# Patient Record
Sex: MALE | Race: WHITE | NOT HISPANIC OR LATINO | Employment: FULL TIME | URBAN - METROPOLITAN AREA
[De-identification: names, ages, dates, MRNs, and addresses within clinical notes are randomized per-mention and may not be internally consistent; named-entity substitution may affect disease eponyms.]

---

## 2017-01-03 ENCOUNTER — COMMUNICATION - HEALTHEAST (OUTPATIENT)
Dept: INTERNAL MEDICINE | Facility: CLINIC | Age: 57
End: 2017-01-03

## 2017-01-17 ENCOUNTER — COMMUNICATION - HEALTHEAST (OUTPATIENT)
Dept: INTERNAL MEDICINE | Facility: CLINIC | Age: 57
End: 2017-01-17

## 2017-01-17 ENCOUNTER — COMMUNICATION - HEALTHEAST (OUTPATIENT)
Dept: TELEHEALTH | Facility: CLINIC | Age: 57
End: 2017-01-17

## 2017-01-17 ENCOUNTER — OFFICE VISIT - HEALTHEAST (OUTPATIENT)
Dept: INTERNAL MEDICINE | Facility: CLINIC | Age: 57
End: 2017-01-17

## 2017-01-17 DIAGNOSIS — I10 ESSENTIAL HYPERTENSION: ICD-10-CM

## 2017-01-17 DIAGNOSIS — Z87.39 HISTORY OF GOUT: ICD-10-CM

## 2017-01-17 DIAGNOSIS — Z13.9 SCREENING: ICD-10-CM

## 2017-01-17 DIAGNOSIS — D17.1 LIPOMA OF TORSO: ICD-10-CM

## 2017-01-17 DIAGNOSIS — Z72.0 TOBACCO ABUSE: ICD-10-CM

## 2017-01-17 DIAGNOSIS — E78.5 HYPERLIPIDEMIA: ICD-10-CM

## 2017-01-17 DIAGNOSIS — E11.9 TYPE 2 DIABETES MELLITUS WITHOUT COMPLICATION (H): ICD-10-CM

## 2017-01-17 DIAGNOSIS — L71.9 ROSACEA: ICD-10-CM

## 2017-01-17 LAB
CHOLEST SERPL-MCNC: 267 MG/DL
FASTING STATUS PATIENT QL REPORTED: YES
HBA1C MFR BLD: 6.7 % (ref 3.5–6)
HDLC SERPL-MCNC: 43 MG/DL
LDLC SERPL CALC-MCNC: 128 MG/DL
LDLC SERPL CALC-MCNC: ABNORMAL MG/DL
PSA SERPL-MCNC: 0.6 NG/ML (ref 0–3.5)
TRIGL SERPL-MCNC: 416 MG/DL

## 2017-01-23 ENCOUNTER — COMMUNICATION - HEALTHEAST (OUTPATIENT)
Dept: INTERNAL MEDICINE | Facility: CLINIC | Age: 57
End: 2017-01-23

## 2017-01-26 ENCOUNTER — RECORDS - HEALTHEAST (OUTPATIENT)
Dept: ADMINISTRATIVE | Facility: OTHER | Age: 57
End: 2017-01-26

## 2017-07-06 ENCOUNTER — COMMUNICATION - HEALTHEAST (OUTPATIENT)
Dept: INTERNAL MEDICINE | Facility: CLINIC | Age: 57
End: 2017-07-06

## 2017-08-08 ENCOUNTER — COMMUNICATION - HEALTHEAST (OUTPATIENT)
Dept: INTERNAL MEDICINE | Facility: CLINIC | Age: 57
End: 2017-08-08

## 2018-01-24 ENCOUNTER — COMMUNICATION - HEALTHEAST (OUTPATIENT)
Dept: INTERNAL MEDICINE | Facility: CLINIC | Age: 58
End: 2018-01-24

## 2018-02-28 ENCOUNTER — COMMUNICATION - HEALTHEAST (OUTPATIENT)
Dept: INTERNAL MEDICINE | Facility: CLINIC | Age: 58
End: 2018-02-28

## 2018-03-01 ENCOUNTER — OFFICE VISIT - HEALTHEAST (OUTPATIENT)
Dept: INTERNAL MEDICINE | Facility: CLINIC | Age: 58
End: 2018-03-01

## 2018-03-01 ENCOUNTER — AMBULATORY - HEALTHEAST (OUTPATIENT)
Dept: INTERNAL MEDICINE | Facility: CLINIC | Age: 58
End: 2018-03-01

## 2018-03-01 ENCOUNTER — RECORDS - HEALTHEAST (OUTPATIENT)
Dept: ADMINISTRATIVE | Facility: OTHER | Age: 58
End: 2018-03-01

## 2018-03-01 DIAGNOSIS — L71.9 ROSACEA: ICD-10-CM

## 2018-03-01 DIAGNOSIS — Z00.00 ROUTINE GENERAL MEDICAL EXAMINATION AT A HEALTH CARE FACILITY: ICD-10-CM

## 2018-03-01 DIAGNOSIS — Z11.59 ENCOUNTER FOR HEPATITIS C SCREENING TEST FOR LOW RISK PATIENT: ICD-10-CM

## 2018-03-01 DIAGNOSIS — Z72.0 TOBACCO ABUSE: ICD-10-CM

## 2018-03-01 DIAGNOSIS — E11.9 TYPE 2 DIABETES MELLITUS WITHOUT COMPLICATION, WITHOUT LONG-TERM CURRENT USE OF INSULIN (H): ICD-10-CM

## 2018-03-01 DIAGNOSIS — E78.5 HYPERLIPIDEMIA: ICD-10-CM

## 2018-03-01 DIAGNOSIS — D17.1 LIPOMA OF TORSO: ICD-10-CM

## 2018-03-01 DIAGNOSIS — I10 ESSENTIAL HYPERTENSION: ICD-10-CM

## 2018-03-01 LAB
ALBUMIN SERPL-MCNC: 4.1 G/DL (ref 3.5–5)
ALP SERPL-CCNC: 88 U/L (ref 45–120)
ALT SERPL W P-5'-P-CCNC: 33 U/L (ref 0–45)
ANION GAP SERPL CALCULATED.3IONS-SCNC: 13 MMOL/L (ref 5–18)
AST SERPL W P-5'-P-CCNC: 29 U/L (ref 0–40)
BILIRUB DIRECT SERPL-MCNC: 0.2 MG/DL
BILIRUB SERPL-MCNC: 0.8 MG/DL (ref 0–1)
BUN SERPL-MCNC: 11 MG/DL (ref 8–22)
CALCIUM SERPL-MCNC: 10.1 MG/DL (ref 8.5–10.5)
CHLORIDE BLD-SCNC: 98 MMOL/L (ref 98–107)
CHOLEST SERPL-MCNC: 225 MG/DL
CO2 SERPL-SCNC: 25 MMOL/L (ref 22–31)
CREAT SERPL-MCNC: 0.81 MG/DL (ref 0.7–1.3)
CREAT UR-MCNC: 24 MG/DL
FASTING STATUS PATIENT QL REPORTED: YES
GFR SERPL CREATININE-BSD FRML MDRD: >60 ML/MIN/1.73M2
GLUCOSE BLD-MCNC: 135 MG/DL (ref 70–125)
HBA1C MFR BLD: 7.4 % (ref 3.5–6)
HDLC SERPL-MCNC: 40 MG/DL
HGB BLD-MCNC: 14.4 G/DL (ref 14–18)
LDLC SERPL CALC-MCNC: 112 MG/DL
MICROALBUMIN UR-MCNC: 1.84 MG/DL (ref 0–1.99)
MICROALBUMIN/CREAT UR: 76.7 MG/G
POTASSIUM BLD-SCNC: 3.9 MMOL/L (ref 3.5–5)
PROT SERPL-MCNC: 7.7 G/DL (ref 6–8)
PSA SERPL-MCNC: 0.7 NG/ML (ref 0–3.5)
SODIUM SERPL-SCNC: 136 MMOL/L (ref 136–145)
TRIGL SERPL-MCNC: 367 MG/DL

## 2018-03-01 RX ORDER — ASCORBIC ACID 500 MG
500 TABLET ORAL DAILY
Status: SHIPPED | COMMUNITY
Start: 2018-03-01 | End: 2023-12-04

## 2018-03-01 ASSESSMENT — MIFFLIN-ST. JEOR
SCORE: 1720.14
SCORE: 1720.14

## 2018-03-02 LAB — HCV AB SERPL QL IA: NEGATIVE

## 2018-03-05 ENCOUNTER — COMMUNICATION - HEALTHEAST (OUTPATIENT)
Dept: INTERNAL MEDICINE | Facility: CLINIC | Age: 58
End: 2018-03-05

## 2018-03-05 DIAGNOSIS — E78.5 HYPERLIPIDEMIA: ICD-10-CM

## 2018-03-20 ENCOUNTER — OFFICE VISIT - HEALTHEAST (OUTPATIENT)
Dept: SURGERY | Facility: CLINIC | Age: 58
End: 2018-03-20

## 2018-03-20 DIAGNOSIS — D17.1 LIPOMA OF TORSO: ICD-10-CM

## 2018-03-20 ASSESSMENT — MIFFLIN-ST. JEOR: SCORE: 1718.67

## 2018-04-03 ENCOUNTER — AMBULATORY - HEALTHEAST (OUTPATIENT)
Dept: SURGERY | Facility: CLINIC | Age: 58
End: 2018-04-03

## 2018-04-03 DIAGNOSIS — D17.1 LIPOMA OF CHEST WALL: ICD-10-CM

## 2018-04-05 LAB
LAB AP CHARGES (HE HISTORICAL CONVERSION): NORMAL
PATH REPORT.COMMENTS IMP SPEC: NORMAL
PATH REPORT.FINAL DX SPEC: NORMAL
PATH REPORT.GROSS SPEC: NORMAL
PATH REPORT.MICROSCOPIC SPEC OTHER STN: NORMAL
PATH REPORT.RELEVANT HX SPEC: NORMAL
RESULT FLAG (HE HISTORICAL CONVERSION): NORMAL

## 2018-06-05 ENCOUNTER — COMMUNICATION - HEALTHEAST (OUTPATIENT)
Dept: INTERNAL MEDICINE | Facility: CLINIC | Age: 58
End: 2018-06-05

## 2018-06-05 DIAGNOSIS — I10 ESSENTIAL HYPERTENSION: ICD-10-CM

## 2018-12-16 ENCOUNTER — AMBULATORY - HEALTHEAST (OUTPATIENT)
Dept: INTENSIVE CARE | Facility: CLINIC | Age: 58
End: 2018-12-16

## 2018-12-16 DIAGNOSIS — J18.9 PARAPNEUMONIC EFFUSION: ICD-10-CM

## 2018-12-16 DIAGNOSIS — J91.8 PARAPNEUMONIC EFFUSION: ICD-10-CM

## 2018-12-19 ENCOUNTER — COMMUNICATION - HEALTHEAST (OUTPATIENT)
Dept: INTERNAL MEDICINE | Facility: CLINIC | Age: 58
End: 2018-12-19

## 2018-12-20 ENCOUNTER — COMMUNICATION - HEALTHEAST (OUTPATIENT)
Dept: CARE COORDINATION | Facility: CLINIC | Age: 58
End: 2018-12-20

## 2019-01-03 ENCOUNTER — OFFICE VISIT - HEALTHEAST (OUTPATIENT)
Dept: INTERNAL MEDICINE | Facility: CLINIC | Age: 59
End: 2019-01-03

## 2019-01-03 DIAGNOSIS — E78.2 MIXED HYPERLIPIDEMIA: ICD-10-CM

## 2019-01-03 DIAGNOSIS — I10 ESSENTIAL HYPERTENSION: ICD-10-CM

## 2019-01-03 DIAGNOSIS — E11.9 TYPE 2 DIABETES MELLITUS WITHOUT COMPLICATION, WITHOUT LONG-TERM CURRENT USE OF INSULIN (H): ICD-10-CM

## 2019-01-03 DIAGNOSIS — J91.8 PARAPNEUMONIC EFFUSION: ICD-10-CM

## 2019-01-03 DIAGNOSIS — Z72.0 TOBACCO ABUSE: ICD-10-CM

## 2019-01-03 DIAGNOSIS — J15.9 COMMUNITY ACQUIRED BACTERIAL PNEUMONIA: ICD-10-CM

## 2019-01-03 DIAGNOSIS — J18.9 PARAPNEUMONIC EFFUSION: ICD-10-CM

## 2019-01-03 LAB
CHOLEST SERPL-MCNC: 166 MG/DL
ERYTHROCYTE [DISTWIDTH] IN BLOOD BY AUTOMATED COUNT: 11.6 % (ref 11–14.5)
FASTING STATUS PATIENT QL REPORTED: YES
HCT VFR BLD AUTO: 33.9 % (ref 40–54)
HDLC SERPL-MCNC: 35 MG/DL
HGB BLD-MCNC: 11.5 G/DL (ref 14–18)
LDLC SERPL CALC-MCNC: 92 MG/DL
MCH RBC QN AUTO: 28.8 PG (ref 27–34)
MCHC RBC AUTO-ENTMCNC: 33.8 G/DL (ref 32–36)
MCV RBC AUTO: 85 FL (ref 80–100)
PLATELET # BLD AUTO: 352 THOU/UL (ref 140–440)
PMV BLD AUTO: 7.4 FL (ref 7–10)
RBC # BLD AUTO: 3.98 MILL/UL (ref 4.4–6.2)
TRIGL SERPL-MCNC: 194 MG/DL
WBC: 9.1 THOU/UL (ref 4–11)

## 2019-01-03 ASSESSMENT — MIFFLIN-ST. JEOR: SCORE: 1729.79

## 2019-01-04 ENCOUNTER — COMMUNICATION - HEALTHEAST (OUTPATIENT)
Dept: INTERNAL MEDICINE | Facility: CLINIC | Age: 59
End: 2019-01-04

## 2019-01-04 ENCOUNTER — AMBULATORY - HEALTHEAST (OUTPATIENT)
Dept: INTERNAL MEDICINE | Facility: CLINIC | Age: 59
End: 2019-01-04

## 2019-01-04 DIAGNOSIS — J18.9 PLEURAL EFFUSION ASSOCIATED WITH PULMONARY INFECTION: ICD-10-CM

## 2019-01-04 DIAGNOSIS — J91.8 PLEURAL EFFUSION ASSOCIATED WITH PULMONARY INFECTION: ICD-10-CM

## 2019-01-15 ENCOUNTER — HOSPITAL ENCOUNTER (OUTPATIENT)
Dept: CT IMAGING | Facility: CLINIC | Age: 59
Discharge: HOME | End: 2019-01-15
Attending: INTERNAL MEDICINE

## 2019-01-15 ENCOUNTER — COMMUNICATION - HEALTHEAST (OUTPATIENT)
Dept: INTERNAL MEDICINE | Facility: CLINIC | Age: 59
End: 2019-01-15

## 2019-01-15 DIAGNOSIS — J18.9 PLEURAL EFFUSION ASSOCIATED WITH PULMONARY INFECTION: ICD-10-CM

## 2019-01-15 DIAGNOSIS — J91.8 PLEURAL EFFUSION ASSOCIATED WITH PULMONARY INFECTION: ICD-10-CM

## 2019-01-15 LAB
CREAT BLD-MCNC: 0.7 MG/DL
POC GFR AMER AF HE - HISTORICAL: >60 ML/MIN/1.73M2
POC GFR NON AMER AF HE - HISTORICAL: >60 ML/MIN/1.73M2

## 2019-01-21 ENCOUNTER — COMMUNICATION - HEALTHEAST (OUTPATIENT)
Dept: INTERNAL MEDICINE | Facility: CLINIC | Age: 59
End: 2019-01-21

## 2019-01-24 ENCOUNTER — OFFICE VISIT - HEALTHEAST (OUTPATIENT)
Dept: PULMONOLOGY | Facility: OTHER | Age: 59
End: 2019-01-24

## 2019-01-24 ENCOUNTER — RECORDS - HEALTHEAST (OUTPATIENT)
Dept: PULMONOLOGY | Facility: OTHER | Age: 59
End: 2019-01-24

## 2019-01-24 DIAGNOSIS — R06.00 DYSPNEA, UNSPECIFIED TYPE: ICD-10-CM

## 2019-01-24 DIAGNOSIS — J90 RECURRENT RIGHT PLEURAL EFFUSION: ICD-10-CM

## 2019-01-24 DIAGNOSIS — K21.9 GASTROESOPHAGEAL REFLUX DISEASE, ESOPHAGITIS PRESENCE NOT SPECIFIED: ICD-10-CM

## 2019-01-24 ASSESSMENT — MIFFLIN-ST. JEOR: SCORE: 1752.47

## 2019-02-04 ENCOUNTER — HOSPITAL ENCOUNTER (OUTPATIENT)
Dept: CT IMAGING | Facility: HOSPITAL | Age: 59
Discharge: HOME | End: 2019-02-04
Attending: INTERNAL MEDICINE

## 2019-02-04 ENCOUNTER — HOSPITAL ENCOUNTER (OUTPATIENT)
Dept: ULTRASOUND IMAGING | Facility: HOSPITAL | Age: 59
Discharge: HOME | End: 2019-02-04
Attending: INTERNAL MEDICINE | Admitting: RADIOLOGY

## 2019-02-04 ENCOUNTER — AMBULATORY - HEALTHEAST (OUTPATIENT)
Dept: LAB | Facility: HOSPITAL | Age: 59
End: 2019-02-04

## 2019-02-04 ENCOUNTER — COMMUNICATION - HEALTHEAST (OUTPATIENT)
Dept: TELEHEALTH | Facility: CLINIC | Age: 59
End: 2019-02-04

## 2019-02-04 DIAGNOSIS — J90 RECURRENT RIGHT PLEURAL EFFUSION: ICD-10-CM

## 2019-02-04 LAB
INR PPP: 0.95 (ref 0.9–1.1)
LDH FLD L TO P-CCNC: NORMAL U/L
PLATELET # BLD AUTO: 251 THOU/UL (ref 140–440)
PROT FLD-MCNC: 5.9 G/DL

## 2019-02-05 LAB
ACID FAST STN SPEC QL: NORMAL
KOH PREPARATION: NORMAL
ORGANISMS/SLIDE: NORMAL
PATH REPORT.COMMENTS IMP SPEC: NORMAL
PATH REPORT.FINAL DX SPEC: NORMAL
PATH REPORT.MICROSCOPIC SPEC OTHER STN: NORMAL
RESULT FLAG (HE HISTORICAL CONVERSION): NORMAL

## 2019-02-07 LAB
BACTERIA SPEC CULT: NO GROWTH
BACTERIA SPEC CULT: NORMAL
CAP COMMENT: NORMAL
GRAM STAIN RESULT: NORMAL
GRAM STAIN RESULT: NORMAL
LAB AP CHARGES (HE HISTORICAL CONVERSION): NORMAL
LAB MED GENERAL PATH INTERP (HE HISTORICAL CONVERSION): NORMAL
PATH REPORT.COMMENTS IMP SPEC: NORMAL
PATH REPORT.FINAL DX SPEC: NORMAL
PATH REPORT.MICROSCOPIC SPEC OTHER STN: NORMAL
PATH REPORT.RELEVANT HX SPEC: NORMAL
RESULT FLAG (HE HISTORICAL CONVERSION): NORMAL
SPECIMEN DESCRIPTION: NORMAL

## 2019-02-18 ENCOUNTER — HOSPITAL ENCOUNTER (OUTPATIENT)
Dept: RESPIRATORY THERAPY | Facility: HOSPITAL | Age: 59
Discharge: HOME | End: 2019-02-18

## 2019-02-18 DIAGNOSIS — R06.00 DYSPNEA, UNSPECIFIED TYPE: ICD-10-CM

## 2019-02-18 LAB — HGB BLD-MCNC: 14.4 G/DL (ref 14–18)

## 2019-02-19 ENCOUNTER — COMMUNICATION - HEALTHEAST (OUTPATIENT)
Dept: INTERNAL MEDICINE | Facility: CLINIC | Age: 59
End: 2019-02-19

## 2019-02-19 ENCOUNTER — OFFICE VISIT - HEALTHEAST (OUTPATIENT)
Dept: PULMONOLOGY | Facility: OTHER | Age: 59
End: 2019-02-19

## 2019-02-19 DIAGNOSIS — Z87.891 PERSONAL HISTORY OF TOBACCO USE, PRESENTING HAZARDS TO HEALTH: ICD-10-CM

## 2019-02-19 DIAGNOSIS — J90 LOCULATED PLEURAL EFFUSION: ICD-10-CM

## 2019-02-19 DIAGNOSIS — J98.4 RESTRICTIVE LUNG DISEASE: ICD-10-CM

## 2019-02-22 ENCOUNTER — COMMUNICATION - HEALTHEAST (OUTPATIENT)
Dept: INTERNAL MEDICINE | Facility: CLINIC | Age: 59
End: 2019-02-22

## 2019-02-22 DIAGNOSIS — I10 ESSENTIAL HYPERTENSION: ICD-10-CM

## 2019-02-26 LAB — BACTERIA SPEC CULT: NORMAL

## 2019-02-28 ENCOUNTER — OFFICE VISIT - HEALTHEAST (OUTPATIENT)
Dept: PULMONOLOGY | Facility: OTHER | Age: 59
End: 2019-02-28

## 2019-02-28 DIAGNOSIS — J90 PLEURAL EFFUSION: ICD-10-CM

## 2019-02-28 ASSESSMENT — MIFFLIN-ST. JEOR: SCORE: 1757.01

## 2019-03-19 LAB — BACTERIA SPEC CULT: NORMAL

## 2019-04-25 ENCOUNTER — HOSPITAL ENCOUNTER (OUTPATIENT)
Dept: CT IMAGING | Facility: HOSPITAL | Age: 59
Discharge: HOME | End: 2019-04-25
Attending: THORACIC SURGERY (CARDIOTHORACIC VASCULAR SURGERY)

## 2019-04-25 ENCOUNTER — OFFICE VISIT - HEALTHEAST (OUTPATIENT)
Dept: PULMONOLOGY | Facility: OTHER | Age: 59
End: 2019-04-25

## 2019-04-25 DIAGNOSIS — J90 PLEURAL EFFUSION: ICD-10-CM

## 2019-04-25 ASSESSMENT — MIFFLIN-ST. JEOR: SCORE: 1784.22

## 2019-04-27 ENCOUNTER — COMMUNICATION - HEALTHEAST (OUTPATIENT)
Dept: INTERNAL MEDICINE | Facility: CLINIC | Age: 59
End: 2019-04-27

## 2019-05-29 ENCOUNTER — COMMUNICATION - HEALTHEAST (OUTPATIENT)
Dept: INTERNAL MEDICINE | Facility: CLINIC | Age: 59
End: 2019-05-29

## 2019-05-29 DIAGNOSIS — E78.5 HYPERLIPIDEMIA: ICD-10-CM

## 2019-07-18 ENCOUNTER — HOSPITAL ENCOUNTER (OUTPATIENT)
Dept: CT IMAGING | Facility: HOSPITAL | Age: 59
Discharge: HOME | End: 2019-07-18
Attending: THORACIC SURGERY (CARDIOTHORACIC VASCULAR SURGERY)

## 2019-07-18 ENCOUNTER — OFFICE VISIT - HEALTHEAST (OUTPATIENT)
Dept: PULMONOLOGY | Facility: OTHER | Age: 59
End: 2019-07-18

## 2019-07-18 DIAGNOSIS — J90 PLEURAL EFFUSION: ICD-10-CM

## 2019-07-18 ASSESSMENT — MIFFLIN-ST. JEOR: SCORE: 1747.93

## 2019-11-21 ENCOUNTER — COMMUNICATION - HEALTHEAST (OUTPATIENT)
Dept: INTERNAL MEDICINE | Facility: CLINIC | Age: 59
End: 2019-11-21

## 2019-11-21 DIAGNOSIS — I10 ESSENTIAL HYPERTENSION: ICD-10-CM

## 2019-12-05 ENCOUNTER — AMBULATORY - HEALTHEAST (OUTPATIENT)
Dept: INTERNAL MEDICINE | Facility: CLINIC | Age: 59
End: 2019-12-05

## 2019-12-05 DIAGNOSIS — E11.9 TYPE 2 DIABETES MELLITUS WITHOUT COMPLICATION, WITHOUT LONG-TERM CURRENT USE OF INSULIN (H): ICD-10-CM

## 2020-02-25 ENCOUNTER — COMMUNICATION - HEALTHEAST (OUTPATIENT)
Dept: INTERNAL MEDICINE | Facility: CLINIC | Age: 60
End: 2020-02-25

## 2020-02-25 DIAGNOSIS — I10 ESSENTIAL HYPERTENSION: ICD-10-CM

## 2020-05-28 ENCOUNTER — COMMUNICATION - HEALTHEAST (OUTPATIENT)
Dept: INTERNAL MEDICINE | Facility: CLINIC | Age: 60
End: 2020-05-28

## 2020-05-28 DIAGNOSIS — I10 ESSENTIAL HYPERTENSION: ICD-10-CM

## 2020-06-15 ENCOUNTER — COMMUNICATION - HEALTHEAST (OUTPATIENT)
Dept: INTERNAL MEDICINE | Facility: CLINIC | Age: 60
End: 2020-06-15

## 2020-06-15 ENCOUNTER — OFFICE VISIT - HEALTHEAST (OUTPATIENT)
Dept: INTERNAL MEDICINE | Facility: CLINIC | Age: 60
End: 2020-06-15

## 2020-06-15 DIAGNOSIS — I10 ESSENTIAL HYPERTENSION: ICD-10-CM

## 2020-06-15 DIAGNOSIS — K21.9 GASTROESOPHAGEAL REFLUX DISEASE, ESOPHAGITIS PRESENCE NOT SPECIFIED: ICD-10-CM

## 2020-06-15 DIAGNOSIS — F17.201 TOBACCO ABUSE, IN REMISSION: ICD-10-CM

## 2020-06-15 DIAGNOSIS — E11.9 TYPE 2 DIABETES MELLITUS WITHOUT COMPLICATION, WITHOUT LONG-TERM CURRENT USE OF INSULIN (H): ICD-10-CM

## 2020-06-15 DIAGNOSIS — E78.2 MIXED HYPERLIPIDEMIA: ICD-10-CM

## 2020-06-15 DIAGNOSIS — J18.9 PLEURAL EFFUSION ASSOCIATED WITH PULMONARY INFECTION: ICD-10-CM

## 2020-06-15 DIAGNOSIS — Z12.5 SCREENING FOR PROSTATE CANCER: ICD-10-CM

## 2020-06-15 DIAGNOSIS — J91.8 PLEURAL EFFUSION ASSOCIATED WITH PULMONARY INFECTION: ICD-10-CM

## 2020-06-15 RX ORDER — FAMOTIDINE 20 MG/1
20 TABLET, FILM COATED ORAL DAILY
Refills: 11 | Status: SHIPPED
Start: 2020-06-15

## 2020-06-15 RX ORDER — CHLORAL HYDRATE 500 MG
1 CAPSULE ORAL
Status: SHIPPED | COMMUNITY
Start: 2020-06-15 | End: 2023-12-04

## 2020-06-30 ENCOUNTER — COMMUNICATION - HEALTHEAST (OUTPATIENT)
Dept: INTERNAL MEDICINE | Facility: CLINIC | Age: 60
End: 2020-06-30

## 2020-06-30 DIAGNOSIS — I10 ESSENTIAL HYPERTENSION: ICD-10-CM

## 2020-07-22 ENCOUNTER — AMBULATORY - HEALTHEAST (OUTPATIENT)
Dept: NURSING | Facility: CLINIC | Age: 60
End: 2020-07-22

## 2020-07-22 ENCOUNTER — AMBULATORY - HEALTHEAST (OUTPATIENT)
Dept: LAB | Facility: CLINIC | Age: 60
End: 2020-07-22

## 2020-07-22 ENCOUNTER — COMMUNICATION - HEALTHEAST (OUTPATIENT)
Dept: INTERNAL MEDICINE | Facility: CLINIC | Age: 60
End: 2020-07-22

## 2020-07-22 DIAGNOSIS — E11.9 TYPE 2 DIABETES MELLITUS WITHOUT COMPLICATION, WITHOUT LONG-TERM CURRENT USE OF INSULIN (H): ICD-10-CM

## 2020-07-22 DIAGNOSIS — Z12.5 SCREENING FOR PROSTATE CANCER: ICD-10-CM

## 2020-07-22 DIAGNOSIS — I10 ESSENTIAL HYPERTENSION: ICD-10-CM

## 2020-07-22 DIAGNOSIS — E78.2 MIXED HYPERLIPIDEMIA: ICD-10-CM

## 2020-07-22 LAB
ALBUMIN SERPL-MCNC: 4.5 G/DL (ref 3.5–5)
ALP SERPL-CCNC: 74 U/L (ref 45–120)
ALT SERPL W P-5'-P-CCNC: 37 U/L (ref 0–45)
ANION GAP SERPL CALCULATED.3IONS-SCNC: 11 MMOL/L (ref 5–18)
AST SERPL W P-5'-P-CCNC: 27 U/L (ref 0–40)
BILIRUB SERPL-MCNC: 1 MG/DL (ref 0–1)
BUN SERPL-MCNC: 10 MG/DL (ref 8–22)
CALCIUM SERPL-MCNC: 10.1 MG/DL (ref 8.5–10.5)
CHLORIDE BLD-SCNC: 100 MMOL/L (ref 98–107)
CHOLEST SERPL-MCNC: 203 MG/DL
CO2 SERPL-SCNC: 27 MMOL/L (ref 22–31)
CREAT SERPL-MCNC: 0.84 MG/DL (ref 0.7–1.3)
CREAT UR-MCNC: 79.8 MG/DL
ERYTHROCYTE [DISTWIDTH] IN BLOOD BY AUTOMATED COUNT: 11.7 % (ref 11–14.5)
FASTING STATUS PATIENT QL REPORTED: YES
GFR SERPL CREATININE-BSD FRML MDRD: >60 ML/MIN/1.73M2
GLUCOSE BLD-MCNC: 123 MG/DL (ref 70–125)
HBA1C MFR BLD: 6.1 % (ref 3.5–6)
HCT VFR BLD AUTO: 42.6 % (ref 40–54)
HDLC SERPL-MCNC: 37 MG/DL
HGB BLD-MCNC: 14.7 G/DL (ref 14–18)
LDLC SERPL CALC-MCNC: 76 MG/DL
LDLC SERPL CALC-MCNC: ABNORMAL MG/DL
MCH RBC QN AUTO: 30.9 PG (ref 27–34)
MCHC RBC AUTO-ENTMCNC: 34.4 G/DL (ref 32–36)
MCV RBC AUTO: 90 FL (ref 80–100)
MICROALBUMIN UR-MCNC: 5.81 MG/DL (ref 0–1.99)
MICROALBUMIN/CREAT UR: 72.8 MG/G
PLATELET # BLD AUTO: 192 THOU/UL (ref 140–440)
PMV BLD AUTO: 7.4 FL (ref 7–10)
POTASSIUM BLD-SCNC: 4.5 MMOL/L (ref 3.5–5)
PROT SERPL-MCNC: 7.2 G/DL (ref 6–8)
PSA SERPL-MCNC: 0.4 NG/ML (ref 0–4.5)
RBC # BLD AUTO: 4.74 MILL/UL (ref 4.4–6.2)
SODIUM SERPL-SCNC: 138 MMOL/L (ref 136–145)
TRIGL SERPL-MCNC: 668 MG/DL
WBC: 9.4 THOU/UL (ref 4–11)

## 2020-07-23 ENCOUNTER — COMMUNICATION - HEALTHEAST (OUTPATIENT)
Dept: INTERNAL MEDICINE | Facility: CLINIC | Age: 60
End: 2020-07-23

## 2020-07-23 ENCOUNTER — AMBULATORY - HEALTHEAST (OUTPATIENT)
Dept: INTERNAL MEDICINE | Facility: CLINIC | Age: 60
End: 2020-07-23

## 2020-07-23 DIAGNOSIS — E78.5 HYPERLIPIDEMIA: ICD-10-CM

## 2020-07-23 RX ORDER — GEMFIBROZIL 600 MG/1
600 TABLET, FILM COATED ORAL DAILY
Qty: 90 TABLET | Refills: 3 | Status: SHIPPED | OUTPATIENT
Start: 2020-07-23 | End: 2021-08-19

## 2020-12-11 ENCOUNTER — OFFICE VISIT - HEALTHEAST (OUTPATIENT)
Dept: INTERNAL MEDICINE | Facility: CLINIC | Age: 60
End: 2020-12-11

## 2020-12-11 DIAGNOSIS — Z51.81 ENCOUNTER FOR THERAPEUTIC DRUG MONITORING: ICD-10-CM

## 2020-12-11 DIAGNOSIS — Z00.00 ROUTINE GENERAL MEDICAL EXAMINATION AT A HEALTH CARE FACILITY: ICD-10-CM

## 2020-12-11 DIAGNOSIS — Z72.0 TOBACCO ABUSE: ICD-10-CM

## 2020-12-11 DIAGNOSIS — Z12.11 SCREEN FOR COLON CANCER: ICD-10-CM

## 2020-12-11 DIAGNOSIS — E78.2 MIXED HYPERLIPIDEMIA: ICD-10-CM

## 2020-12-11 DIAGNOSIS — E11.9 TYPE 2 DIABETES MELLITUS WITHOUT COMPLICATION, WITHOUT LONG-TERM CURRENT USE OF INSULIN (H): ICD-10-CM

## 2020-12-11 DIAGNOSIS — I10 ESSENTIAL HYPERTENSION: ICD-10-CM

## 2020-12-11 DIAGNOSIS — L71.9 ROSACEA: ICD-10-CM

## 2020-12-11 DIAGNOSIS — Z23 NEED FOR IMMUNIZATION AGAINST INFLUENZA: ICD-10-CM

## 2020-12-11 DIAGNOSIS — N52.9 ERECTILE DYSFUNCTION, UNSPECIFIED ERECTILE DYSFUNCTION TYPE: ICD-10-CM

## 2020-12-11 DIAGNOSIS — Z87.39 HISTORY OF GOUT: ICD-10-CM

## 2020-12-11 LAB
ALBUMIN SERPL-MCNC: 4.5 G/DL (ref 3.5–5)
ALBUMIN UR-MCNC: NEGATIVE MG/DL
ALP SERPL-CCNC: 74 U/L (ref 45–120)
ALT SERPL W P-5'-P-CCNC: 22 U/L (ref 0–45)
ANION GAP SERPL CALCULATED.3IONS-SCNC: 14 MMOL/L (ref 5–18)
APPEARANCE UR: CLEAR
AST SERPL W P-5'-P-CCNC: 20 U/L (ref 0–40)
BILIRUB SERPL-MCNC: 1 MG/DL (ref 0–1)
BILIRUB UR QL STRIP: NEGATIVE
BUN SERPL-MCNC: 9 MG/DL (ref 8–22)
CALCIUM SERPL-MCNC: 9.5 MG/DL (ref 8.5–10.5)
CHLORIDE BLD-SCNC: 103 MMOL/L (ref 98–107)
CHOLEST SERPL-MCNC: 188 MG/DL
CK SERPL-CCNC: 93 U/L (ref 30–190)
CO2 SERPL-SCNC: 22 MMOL/L (ref 22–31)
COLOR UR AUTO: YELLOW
CREAT SERPL-MCNC: 0.78 MG/DL (ref 0.7–1.3)
FASTING STATUS PATIENT QL REPORTED: YES
GFR SERPL CREATININE-BSD FRML MDRD: >60 ML/MIN/1.73M2
GLUCOSE BLD-MCNC: 111 MG/DL (ref 70–125)
GLUCOSE UR STRIP-MCNC: NEGATIVE MG/DL
HBA1C MFR BLD: 6.2 %
HDLC SERPL-MCNC: 45 MG/DL
HGB BLD-MCNC: 14.3 G/DL (ref 14–18)
HGB UR QL STRIP: NEGATIVE
KETONES UR STRIP-MCNC: NEGATIVE MG/DL
LDLC SERPL CALC-MCNC: 68 MG/DL
LEUKOCYTE ESTERASE UR QL STRIP: NEGATIVE
MAGNESIUM SERPL-MCNC: 2 MG/DL (ref 1.8–2.6)
NITRATE UR QL: NEGATIVE
PH UR STRIP: 5.5 [PH] (ref 5–8)
POTASSIUM BLD-SCNC: 3.8 MMOL/L (ref 3.5–5)
PROT SERPL-MCNC: 7.2 G/DL (ref 6–8)
SODIUM SERPL-SCNC: 139 MMOL/L (ref 136–145)
SP GR UR STRIP: 1.01 (ref 1–1.03)
TRIGL SERPL-MCNC: 375 MG/DL
URATE SERPL-MCNC: 6.5 MG/DL (ref 3–8)
UROBILINOGEN UR STRIP-ACNC: NORMAL

## 2020-12-11 RX ORDER — LISINOPRIL AND HYDROCHLOROTHIAZIDE 12.5; 2 MG/1; MG/1
2 TABLET ORAL DAILY
Qty: 180 TABLET | Refills: 3 | Status: SHIPPED | OUTPATIENT
Start: 2020-12-11 | End: 2021-12-15

## 2020-12-11 RX ORDER — SILDENAFIL 100 MG/1
50-100 TABLET, FILM COATED ORAL DAILY PRN
Qty: 6 TABLET | Refills: 11 | Status: SHIPPED | OUTPATIENT
Start: 2020-12-11 | End: 2022-06-03

## 2020-12-11 ASSESSMENT — MIFFLIN-ST. JEOR: SCORE: 1693.5

## 2020-12-12 ENCOUNTER — COMMUNICATION - HEALTHEAST (OUTPATIENT)
Dept: INTERNAL MEDICINE | Facility: CLINIC | Age: 60
End: 2020-12-12

## 2021-01-02 ENCOUNTER — RECORDS - HEALTHEAST (OUTPATIENT)
Dept: ADMINISTRATIVE | Facility: OTHER | Age: 61
End: 2021-01-02

## 2021-01-02 LAB — COLOGUARD-ABSTRACT: POSITIVE

## 2021-01-06 ENCOUNTER — COMMUNICATION - HEALTHEAST (OUTPATIENT)
Dept: ADMINISTRATIVE | Facility: CLINIC | Age: 61
End: 2021-01-06

## 2021-01-07 ENCOUNTER — AMBULATORY - HEALTHEAST (OUTPATIENT)
Dept: INTERNAL MEDICINE | Facility: CLINIC | Age: 61
End: 2021-01-07

## 2021-01-07 DIAGNOSIS — Z12.11 COLON CANCER SCREENING: ICD-10-CM

## 2021-01-08 ENCOUNTER — COMMUNICATION - HEALTHEAST (OUTPATIENT)
Dept: INTERNAL MEDICINE | Facility: CLINIC | Age: 61
End: 2021-01-08

## 2021-01-14 ENCOUNTER — RECORDS - HEALTHEAST (OUTPATIENT)
Dept: HEALTH INFORMATION MANAGEMENT | Facility: CLINIC | Age: 61
End: 2021-01-14

## 2021-01-25 ENCOUNTER — RECORDS - HEALTHEAST (OUTPATIENT)
Dept: ADMINISTRATIVE | Facility: OTHER | Age: 61
End: 2021-01-25

## 2021-02-02 ENCOUNTER — COMMUNICATION - HEALTHEAST (OUTPATIENT)
Dept: ADMINISTRATIVE | Facility: CLINIC | Age: 61
End: 2021-02-02

## 2021-03-05 ENCOUNTER — COMMUNICATION - HEALTHEAST (OUTPATIENT)
Dept: INTERNAL MEDICINE | Facility: CLINIC | Age: 61
End: 2021-03-05

## 2021-03-05 DIAGNOSIS — E11.9 TYPE 2 DIABETES MELLITUS WITHOUT COMPLICATION, WITHOUT LONG-TERM CURRENT USE OF INSULIN (H): ICD-10-CM

## 2021-03-05 RX ORDER — ATORVASTATIN CALCIUM 10 MG/1
10 TABLET, FILM COATED ORAL DAILY
Qty: 90 TABLET | Refills: 3 | Status: SHIPPED | OUTPATIENT
Start: 2021-03-05 | End: 2022-02-15

## 2021-04-05 ENCOUNTER — COMMUNICATION - HEALTHEAST (OUTPATIENT)
Dept: ADMINISTRATIVE | Facility: CLINIC | Age: 61
End: 2021-04-05

## 2021-04-10 ENCOUNTER — AMBULATORY - HEALTHEAST (OUTPATIENT)
Dept: NURSING | Facility: CLINIC | Age: 61
End: 2021-04-10

## 2021-04-13 ENCOUNTER — COMMUNICATION - HEALTHEAST (OUTPATIENT)
Dept: ADMINISTRATIVE | Facility: CLINIC | Age: 61
End: 2021-04-13

## 2021-05-26 ENCOUNTER — RECORDS - HEALTHEAST (OUTPATIENT)
Dept: ADMINISTRATIVE | Facility: CLINIC | Age: 61
End: 2021-05-26

## 2021-05-28 NOTE — TELEPHONE ENCOUNTER
Dr. Gibson,     Patient is not meeting diabetic quality due to LDL 92 and not on a statin.     Please review,     Tila Kerr LPN

## 2021-05-29 NOTE — TELEPHONE ENCOUNTER
RN cannot approve Refill Request    RN can NOT refill this medication pt states takes 1 tab daily.       Melba Leija, [facility] Triage/Med Refill 5/29/2019    Requested Prescriptions   Pending Prescriptions Disp Refills     gemfibrozil (LOPID) 600 MG tablet [Pharmacy Med Name: GEMFIBROZIL 600MG TABLETS] 180 tablet 0     Sig: TAKE 1 TABLET BY MOUTH TWICE DAILY       Gemfibrozil Refill Protocol Passed - 5/29/2019  3:11 AM        Passed - Fasting lipid cascade in last 12 months     Cholesterol   Date Value Ref Range Status   01/03/2019 166 <=199 mg/dL Final     Triglycerides   Date Value Ref Range Status   01/03/2019 194 (H) <=149 mg/dL Final     HDL Cholesterol   Date Value Ref Range Status   01/03/2019 35 (L) >=40 mg/dL Final     LDL Calculated   Date Value Ref Range Status   01/03/2019 92 <=129 mg/dL Final     Patient Fasting > 8hrs?   Date Value Ref Range Status   01/03/2019 Yes  Final             Passed - AST or ALT in last 12 months     AST   Date Value Ref Range Status   12/13/2018 9 0 - 40 U/L Final     ALT   Date Value Ref Range Status   12/13/2018 11 0 - 45 U/L Final               Passed - PCP or prescribing provider visit in last 12 months       Last office visit with prescriber/PCP: 1/3/2019 Mckinley Gibson MD OR same dept: 1/3/2019 Mckinley Gibson MD OR same specialty: 1/3/2019 Mckinley Gibson MD  Last physical: 3/1/2018 Last MTM visit: Visit date not found   Next visit within 3 mo: Visit date not found  Next physical within 3 mo: Visit date not found  Prescriber OR PCP: Mckinley Gibson MD  Last diagnosis associated with med order: 1. Hyperlipidemia  - gemfibrozil (LOPID) 600 MG tablet [Pharmacy Med Name: GEMFIBROZIL 600MG TABLETS]; TAKE 1 TABLET BY MOUTH TWICE DAILY  Dispense: 180 tablet; Refill: 0    If protocol passes may refill for 12 months if within 3 months of last provider visit (or a total of 15 months).

## 2021-05-30 VITALS — WEIGHT: 213 LBS

## 2021-05-31 VITALS — BODY MASS INDEX: 32.96 KG/M2 | WEIGHT: 210 LBS | HEIGHT: 67 IN

## 2021-06-01 VITALS — BODY MASS INDEX: 31.64 KG/M2 | HEIGHT: 68 IN | WEIGHT: 208.8 LBS

## 2021-06-02 ENCOUNTER — RECORDS - HEALTHEAST (OUTPATIENT)
Dept: ADMINISTRATIVE | Facility: CLINIC | Age: 61
End: 2021-06-02

## 2021-06-02 VITALS — WEIGHT: 211 LBS | HEIGHT: 69 IN | BODY MASS INDEX: 31.25 KG/M2

## 2021-06-02 VITALS — HEIGHT: 69 IN | WEIGHT: 206 LBS | BODY MASS INDEX: 30.51 KG/M2

## 2021-06-02 VITALS — WEIGHT: 218 LBS | HEIGHT: 69 IN | BODY MASS INDEX: 32.29 KG/M2

## 2021-06-02 VITALS — WEIGHT: 214.2 LBS | BODY MASS INDEX: 31.63 KG/M2

## 2021-06-02 VITALS — BODY MASS INDEX: 31.4 KG/M2 | HEIGHT: 69 IN | WEIGHT: 212 LBS

## 2021-06-03 VITALS — WEIGHT: 210 LBS | BODY MASS INDEX: 31.1 KG/M2 | HEIGHT: 69 IN

## 2021-06-03 NOTE — TELEPHONE ENCOUNTER
Refill Approved    Rx renewed per Medication Renewal Policy. Medication was last renewed on 2/24/19.    Sanjuanita Winters, [facility] Triage/Med Refill 11/21/2019     Requested Prescriptions   Pending Prescriptions Disp Refills     lisinopril-hydrochlorothiazide (PRINZIDE,ZESTORETIC) 20-12.5 mg per tablet [Pharmacy Med Name: LISINOPRIL-HCTZ 20/12.5MG TABLETS] 90 tablet 0     Sig: TAKE 1 TABLET BY MOUTH DAILY       Diuretics/Combination Diuretics Refill Protocol  Passed - 11/21/2019  3:11 AM        Passed - Visit with PCP or prescribing provider visit in past 12 months     Last office visit with prescriber/PCP: 1/3/2019 Mckinley Gibson MD OR same dept: 1/3/2019 Mckinley Gibson MD OR same specialty: 1/3/2019 Mckinley Gibson MD  Last physical: 3/1/2018 Last MTM visit: Visit date not found   Next visit within 3 mo: Visit date not found  Next physical within 3 mo: Visit date not found  Prescriber OR PCP: Mckinley Gibson MD  Last diagnosis associated with med order: 1. Essential hypertension  - lisinopril-hydrochlorothiazide (PRINZIDE,ZESTORETIC) 20-12.5 mg per tablet [Pharmacy Med Name: LISINOPRIL-HCTZ 20/12.5MG TABLETS]; TAKE 1 TABLET BY MOUTH DAILY  Dispense: 90 tablet; Refill: 0    If protocol passes may refill for 12 months if within 3 months of last provider visit (or a total of 15 months).             Passed - Serum Potassium in past 12 months      Lab Results   Component Value Date    Potassium 3.7 12/13/2018             Passed - Serum Sodium in past 12 months      Lab Results   Component Value Date    Sodium 140 12/13/2018             Passed - Blood pressure on file in past 12 months     BP Readings from Last 1 Encounters:   07/18/19 128/80             Passed - Serum Creatinine in past 12 months      Creatinine   Date Value Ref Range Status   12/13/2018 0.92 0.70 - 1.30 mg/dL Final

## 2021-06-04 VITALS — WEIGHT: 215 LBS | BODY MASS INDEX: 31.75 KG/M2

## 2021-06-05 VITALS
BODY MASS INDEX: 32.18 KG/M2 | HEART RATE: 62 BPM | OXYGEN SATURATION: 99 % | DIASTOLIC BLOOD PRESSURE: 72 MMHG | WEIGHT: 205 LBS | SYSTOLIC BLOOD PRESSURE: 146 MMHG | HEIGHT: 67 IN

## 2021-06-06 NOTE — TELEPHONE ENCOUNTER
Left message to call back for: appointment  Information to relay to patient:  Help schedule physical

## 2021-06-06 NOTE — TELEPHONE ENCOUNTER
RN cannot approve Refill Request    RN can NOT refill this medication Protocol failed and NO refill given.       Melba Leija, [facility] Triage/Med Refill 2/25/2020    Requested Prescriptions   Pending Prescriptions Disp Refills     lisinopriL-hydrochlorothiazide (PRINZIDE,ZESTORETIC) 20-12.5 mg per tablet [Pharmacy Med Name: LISINOPRIL-HCTZ 20/12.5MG TABLETS] 90 tablet 0     Sig: TAKE 1 TABLET BY MOUTH DAILY       Diuretics/Combination Diuretics Refill Protocol  Failed - 2/25/2020  8:02 AM        Failed - Visit with PCP or prescribing provider visit in past 12 months     Last office visit with prescriber/PCP: 1/3/2019 Mckinley Gibson MD OR same dept: Visit date not found OR same specialty: 1/3/2019 Mckinley Gibson MD  Last physical: 3/1/2018 Last MTM visit: Visit date not found   Next visit within 3 mo: Visit date not found  Next physical within 3 mo: Visit date not found  Prescriber OR PCP: Mckinley Gibson MD  Last diagnosis associated with med order: 1. Essential hypertension  - lisinopriL-hydrochlorothiazide (PRINZIDE,ZESTORETIC) 20-12.5 mg per tablet [Pharmacy Med Name: LISINOPRIL-HCTZ 20/12.5MG TABLETS]; TAKE 1 TABLET BY MOUTH DAILY  Dispense: 90 tablet; Refill: 0    If protocol passes may refill for 12 months if within 3 months of last provider visit (or a total of 15 months).             Failed - Serum Potassium in past 12 months      No results found for: LN-POTASSIUM          Failed - Serum Sodium in past 12 months      No results found for: LN-SODIUM          Failed - Serum Creatinine in past 12 months      Creatinine   Date Value Ref Range Status   12/13/2018 0.92 0.70 - 1.30 mg/dL Final             Passed - Blood pressure on file in past 12 months     BP Readings from Last 1 Encounters:   07/18/19 128/80

## 2021-06-08 NOTE — TELEPHONE ENCOUNTER
Patient Returning Call  Reason for call:  Call back  Information relayed to patient:  Writer relayed message to Pt: Please schedule him for a 20-minute video/telephone visit in the next 4 weeks as he has not been seen since January 2019.  I will send a 30-day supply of his medication.  Pt agrees, and understands.  Writer transferred Pt to scheduling.  Patient has additional questions:  No  If YES, what are your questions/concerns:  N/A  Okay to leave a detailed message?: No call back needed

## 2021-06-08 NOTE — TELEPHONE ENCOUNTER
RN cannot approve Refill Request    RN can NOT refill this medication Protocol failed and NO refill given.       Melba Leija, [facility] Triage/Med Refill 6/1/2020    Requested Prescriptions   Pending Prescriptions Disp Refills     lisinopriL-hydrochlorothiazide (PRINZIDE,ZESTORETIC) 20-12.5 mg per tablet [Pharmacy Med Name: LISINOPRIL-HCTZ 20/12.5MG TABLETS] 90 tablet 3     Sig: TAKE 1 TABLET BY MOUTH DAILY. FOLLOW-UP APPOINTMENT NEEDED FOR FURTHER REFILLS       Diuretics/Combination Diuretics Refill Protocol  Failed - 5/28/2020  4:38 PM        Failed - Visit with PCP or prescribing provider visit in past 12 months     Last office visit with prescriber/PCP: 1/3/2019 Mckinley Gibson MD OR same dept: Visit date not found OR same specialty: 1/3/2019 Mckinley Gibson MD  Last physical: 3/1/2018 Last MTM visit: Visit date not found   Next visit within 3 mo: Visit date not found  Next physical within 3 mo: Visit date not found  Prescriber OR PCP: Mckinley Gibson MD  Last diagnosis associated with med order: 1. Essential hypertension  - lisinopriL-hydrochlorothiazide (PRINZIDE,ZESTORETIC) 20-12.5 mg per tablet [Pharmacy Med Name: LISINOPRIL-HCTZ 20/12.5MG TABLETS]; Take 1 tablet by mouth daily. Follow-up appointment needed for further refills  Dispense: 90 tablet; Refill: 0    If protocol passes may refill for 12 months if within 3 months of last provider visit (or a total of 15 months).             Failed - Serum Potassium in past 12 months      No results found for: LN-POTASSIUM          Failed - Serum Sodium in past 12 months      No results found for: LN-SODIUM          Failed - Serum Creatinine in past 12 months      Creatinine   Date Value Ref Range Status   12/13/2018 0.92 0.70 - 1.30 mg/dL Final             Passed - Blood pressure on file in past 12 months     BP Readings from Last 1 Encounters:   07/18/19 128/80

## 2021-06-08 NOTE — TELEPHONE ENCOUNTER
Please schedule him for a 20-minute video/telephone visit in the next 4 weeks as he has not been seen since January 2019.  I will send a 30-day supply of his medication.

## 2021-06-08 NOTE — PROGRESS NOTES
"Emile Osborne is a 60 y.o. male who is being evaluated via a billable telephone visit.      The patient has been notified of following:     \"This telephone visit will be conducted via a call between you and your physician/provider. We have found that certain health care needs can be provided without the need for a physical exam.  This service lets us provide the care you need with a short phone conversation.  If a prescription is necessary we can send it directly to your pharmacy.  If lab work is needed we can place an order for that and you can then stop by our lab to have the test done at a later time.    Telephone visits are billed at different rates depending on your insurance coverage. During this emergency period, for some insurers they may be billed the same as an in-person visit.  Please reach out to your insurance provider with any questions.    If during the course of the call the physician/provider feels a telephone visit is not appropriate, you will not be charged for this service.\"    Patient has given verbal consent to a Telephone visit? Yes    What phone number would you like to be contacted at? [phone]        Additional provider notes:     Office Visit - Follow Up   Emile Osborne   60 y.o. male    Date of Visit: 6/15/2020    Chief Complaint   Patient presents with     medicatin check        Assessment and Plan   1. Type 2 diabetes mellitus without complication, without long-term current use of insulin (H)  Diabetes has been controlled with diet.  Monitor A1c.  Continue annual diabetic eye exams.  Check microalbumin.  He does take a statin daily.  - Glycosylated Hemoglobin A1c; Future  - Microalbumin, Random Urine; Future    2. Essential hypertension  He needs his blood pressure rechecked and hopefully this can be done at his upcoming lab appointment  - Comprehensive Metabolic Panel; Future  - HM2(CBC w/o Differential); Future    3. Pleural effusion associated with pulmonary " infection  Hospitalized with aspiration pneumonia and empyema in 2018.  Resolved on follow-up CT scan summer 2019.    Continues famotidine daily    4. Mixed hyperlipidemia  Continues on atorvastatin.  Will recheck lipid profile  - Lipid [place] FASTING; Future    5. Tobacco abuse, in remission  Successfully quit smoking in 2019.  I congratulated him on this accomplishment.    6. Screening for prostate cancer  We will check prostate exam later in the year at his physical but will obtain PSA now  - PSA, Annual Screen (Prostatic-Specific Antigen); Future    Return in about 6 months (around 12/15/2020) for Annual physical.     History of Present Illness   This 60 y.o. old male with hypertension, hyperlipidemia, and type 2 diabetes.  Hospitalized in late 2018 with pneumonia and found to have empyema requiring thoracentesis and placement of drain.  Followed up with pulmonary and eventually with complete resolution of pleural effusion in summer 2019.  He successfully quit smoking following that hospitalization.  Feeling quite well.  Denies any chest pain or shortness of breath.  He has not had a chance to check his blood pressure lately.  Diabetes has been well controlled in the past with A1c under 7%.  He does try to go see an eye doctor every year.  Taking famotidine to control reflux after it was explained that this increases the risk for aspiration.  Continues on atorvastatin to manage his cholesterol.    Review of Systems:  Otherwise, a comprehensive review of systems was negative except as noted.     Medications, Allergies and Problem List   Patient Active Problem List   Diagnosis     Type 2 diabetes mellitus without complication (H)     Hyperlipidemia     HTN (hypertension)     Lipoma of torso     History of gout     Rosacea     Pleural effusion associated with pulmonary infection     Tobacco abuse, in remission       He has a past surgical history that includes Varicose vein surgery; Knee arthroscopy; Posterior  laminectomy / decompression cervical spine (); Colonoscopy; Cataract extraction (); US Thoracentesis (2018);  Pleural Drainage With Catheter Insertion (2018); and US Thoracentesis (2019).    Allergies   Allergen Reactions     Hay Fever And Allergy Relief        Current Outpatient Medications   Medication Sig Dispense Refill     ascorbic acid, vitamin C, (ASCORBIC ACID WITH MARIA L HIPS) 500 MG tablet Take 500 mg by mouth daily.       atorvastatin (LIPITOR) 10 MG tablet Take 1 tablet (10 mg total) by mouth daily. 90 tablet 3     blood glucose meter (ONETOUCH ULTRAMINI) Use 1 each As Directed daily Dispense glucometer brand per patient's insurance at pharmacy discretion.. 1 each 3     blood glucose test strips Use 1 each As Directed daily Dispense brand per patient's insurance at pharmacy discretion.. 100 strip 3     calcium carbonate (OS-LOIDA) 600 mg calcium (1,500 mg) tablet Take 600 mg by mouth daily.       famotidine (PEPCID) 20 MG tablet Take 1 tablet (20 mg total) by mouth daily.  11     fish oil-omega-3 fatty acids (FISH OIL) 300-1,000 mg capsule Take 1 g by mouth.       generic lancets (ACCU-CHEK SOFTCLIX LANCETS) Test once daily Dispense brand per patient's insurance at pharmacy discretion.. 100 each 3     metroNIDAZOLE (METROGEL) 0.75 % gel Apply 1 application topically 2 (two) times a day Apply twice daily.       multivitamin therapeutic tablet Take 1 tablet by mouth daily.       lisinopriL-hydrochlorothiazide (PRINZIDE,ZESTORETIC) 20-12.5 mg per tablet Take 1 tablet by mouth daily. 90 tablet 1     No current facility-administered medications for this visit.         Physical Exam       Wt 215 lb (97.5 kg)   BMI 31.75 kg/m               Additional Information   Social History     Tobacco Use     Smoking status: Former Smoker     Packs/day: 1.00     Years: 35.00     Pack years: 35.00     Types: Cigarettes     Last attempt to quit: 2018     Years since quittin.5     Smokeless  tobacco: Never Used     Tobacco comment: ONE PACK PER WEEK   Substance Use Topics     Alcohol use: Yes     Comment: 1-2/day     Drug use: No            Mckinley Gibson MD        Phone call duration:  22 minutes  Call began 2:23 PM call ended 2:45 PM    Mckinley Gibson MD

## 2021-06-08 NOTE — PROGRESS NOTES
Office Visit - Follow Up   Emile Osborne   56 y.o. male    Date of Visit: 1/17/2017    Chief Complaint   Patient presents with     medication check        Assessment and Plan   1. Type 2 diabetes mellitus without complication  His last hemoglobin A1c was 7%.  He is asymptomatic.  If higher than 7%, I will recommend diabetes education and will get him equipment at home to start monitoring his sugars.  Emphasizing the importance of regular exercise and weight loss.  He should see an ophthalmologist annually.  We'll check appropriate labs.  - Urinalysis  - Microalbumin, Random Urine  - Basic Metabolic Panel  - Glycosylated Hemoglobin A1c    2. Hyperlipidemia  Remains on gemfibrozil 600 mg twice a day.  He is noticing some flushing side effect.  I'm recommending he start aspirin again but only 81 mg once weekly.  If any recurrent hematuria, will need to discontinue.  He has multiple risk factors for cardiovascular disease  - Lipid Cascade  - Hepatic Profile    3. Essential hypertension  Blood pressure looking adequately controlled with current dose of lisinopril/HCTZ  - Hemoglobin    4. Lipoma of torso  Reassurance regarding benign feeling lipoma.  Measuring at 5 cm.  It has not changed much in size for over 5 years    5. Tobacco abuse  Urging him to quit smoking but he is not ready to quit at this time.  Recommending lung cancer screening with annual low-dose CT scan  - CT Low Dose Lung Screening Chest; Future    6. History of gout  No recent gout attacks.  Recheck uric acid.  Maintain good hydration.  - Uric Acid    7. Screening  Prostate exam was normal  - PSA (Prostatic-Specific Antigen), Annual Screen  - Ambulatory referral for Colonoscopy    8. Rosacea  Restart MetroGel but try applying twice daily.  We discussed avoiding too much sun exposure    Return in about 1 year (around 1/17/2018) for Annual physical.     History of Present Illness   This 56 y.o. old gentleman here to follow-up multiple medical  problems.  Not seen for over 2 years.  He remains on lisinopril/HCTZ to manage hypertension.  Using gemfibrozil for hyperlipidemia.  No longer takes aspirin as he had hematuria 10 years ago with neurologic evaluation including cystoscopy.  Multiple risk factors for cardiovascular disease including ongoing chronic tobacco abuse.  Not interested in quitting.  Denies any exertional chest pain.  He also has type 2 diabetes.  Last hemoglobin A1c 7%.  He does not monitor his sugars.  He is concerned about a lump on his back.  This is been present for at least 5 years.  It may have become slightly larger.  No significant pain.  Also bothered by rosacea.  He has tried MetroGel in the past with only fair results.  He is overdue for a colonoscopy the last in 2003.  He has not had his flu shot.  He has a history of gout but has had no recent exacerbations.  Successful cataract surgery in 2014.     Review of Systems:  Otherwise, a comprehensive review of systems was negative except as noted.     Medications, Allergies and Problem List   Patient Active Problem List   Diagnosis     Type 2 diabetes mellitus without complication     Hyperlipidemia     HTN (hypertension)     Lipoma of torso     Tobacco abuse     History of gout     Rosacea       He has a past surgical history that includes Varicose vein surgery; Knee arthroscopy; and Posterior laminectomy / decompression cervical spine (2002).    No Known Allergies    Current Outpatient Prescriptions   Medication Sig Dispense Refill     gemfibrozil (LOPID) 600 MG tablet Take 1 tablet (600 mg total) by mouth 2 (two) times a day. 180 tablet 1     lisinopril-hydrochlorothiazide (PRINZIDE,ZESTORETIC) 20-12.5 mg per tablet TAKE 1 TABLET BY MOUTH DAILY 90 tablet 1     aspirin 81 MG EC tablet Take 1 tablet (81 mg total) by mouth once a week. 150 tablet 2     metroNIDAZOLE (METROGEL) 0.75 % gel Apply twice daily 45 g 11     No current facility-administered medications for this visit.          Physical Exam   General Appearance:   Well-appearing middle-aged male    Visit Vitals     /80 (Patient Site: Left Arm, Patient Position: Sitting, Cuff Size: Adult Large)     Pulse 61     Wt 213 lb (96.6 kg)     SpO2 98%       HEENT: Normal  Neck without lymphadenopathy or thyromegaly  Respiratory: Normal respiratory effort.  Lungs are clear with no rales or wheezes.  Heart: Regular rate and rhythm without murmurs, rubs, or gallops.  No carotid bruits.  Abdomen: Abdomen is soft, nontender without guarding, rebound, masses, or hepatosplenomegaly.  Prostate smooth, nontender, normal size without nodules  Extremities: No peripheral edema.  Neurologic: Grossly nonfocal  Skin: No cyanosis or pallor.  Rosacea involving face.  Benign feeling 5 cm lipoma involving his right mid back.  Psych: Alert and oriented ×3, mood appropriate         Additional Information   Social History   Substance Use Topics     Smoking status: Current Every Day Smoker     Smokeless tobacco: None     Alcohol use None             Time: total time spent with the patient was 40 minutes of which >50% was spent in counseling and coordination of care     Mckinley Gibson MD

## 2021-06-09 NOTE — TELEPHONE ENCOUNTER
Left message to call back for: Bill  Information to relay to patient:  Please relay message below from Dr. Gibson.  Thank you.  Mary Kay BUITRAGO, ZAKIA/CMT....................1:51 PM

## 2021-06-09 NOTE — TELEPHONE ENCOUNTER
"Patient had labs drawn this morning- would like an order for COVID antibody added on- he is asymptomatic, works at [employer] and reports that he has a co-worker who has travelled to a COVID \"hot-spot\" ([place]) and is worried that he has been exposed.     OK to add on to blood work per patient request??   "

## 2021-06-09 NOTE — TELEPHONE ENCOUNTER
RN cannot approve Refill Request    RN can NOT refill this medication Protocol failed and NO refill given.    Melba Leiaj, [facility] Triage/Med Refill 7/1/2020    Requested Prescriptions   Pending Prescriptions Disp Refills     lisinopriL-hydrochlorothiazide (PRINZIDE,ZESTORETIC) 20-12.5 mg per tablet [Pharmacy Med Name: LISINOPRIL-HCTZ 20/12.5MG TABLETS] 30 tablet 0     Sig: TAKE 1 TABLET BY MOUTH DAILY. FOLLOW-UP APPOINTMENT NEEDED FOR FURTHER REFILLS       Diuretics/Combination Diuretics Refill Protocol  Failed - 6/30/2020 10:43 AM        Failed - Visit with PCP or prescribing provider visit in past 12 months     Last office visit with prescriber/PCP: 1/3/2019 Mckinley Gibson MD OR same dept: Visit date not found OR same specialty: 1/3/2019 Mckinley Gibson MD  Last physical: 3/1/2018 Last MTM visit: Visit date not found   Next visit within 3 mo: Visit date not found  Next physical within 3 mo: Visit date not found  Prescriber OR PCP: Mckinley Gibson MD  Last diagnosis associated with med order: 1. Essential hypertension  - lisinopriL-hydrochlorothiazide (PRINZIDE,ZESTORETIC) 20-12.5 mg per tablet [Pharmacy Med Name: LISINOPRIL-HCTZ 20/12.5MG TABLETS]; TAKE 1 TABLET BY MOUTH DAILY. FOLLOW-UP APPOINTMENT NEEDED FOR FURTHER REFILLS  Dispense: 30 tablet; Refill: 0    If protocol passes may refill for 12 months if within 3 months of last provider visit (or a total of 15 months).             Failed - Serum Potassium in past 12 months      No results found for: LN-POTASSIUM          Failed - Serum Sodium in past 12 months      No results found for: LN-SODIUM          Failed - Serum Creatinine in past 12 months      Creatinine   Date Value Ref Range Status   12/13/2018 0.92 0.70 - 1.30 mg/dL Final             Passed - Blood pressure on file in past 12 months     BP Readings from Last 1 Encounters:   07/18/19 128/80

## 2021-06-09 NOTE — TELEPHONE ENCOUNTER
Unless coworker was confirmed to have COVID, I do not see that checking antibody level makes sense.

## 2021-06-09 NOTE — PROGRESS NOTES
I met with Emile Osborne at the request of Dr. Gibson to recheck his blood pressure.  Blood pressure medications on the MAR were reviewed with patient.    Patient has taken all medications as per usual regimen: Yes  Patient reports tolerating them without any issues or concerns: Yes    There were no vitals filed for this visit.     BP: 138/88 L arm     Blood pressure was taken, previous encounter was reviewed, recorded blood pressure below 140/90.  Patient was discharged and the note will be sent to the provider for final review.

## 2021-06-13 NOTE — PATIENT INSTRUCTIONS - HE
Recommending shingles vaccine, Shingrix.  Check with your insurance whether this is covered.  They can also be obtained at your pharmacy.    Cologuard for colon cancer screening    Schedule annual diabetic eye exam

## 2021-06-14 NOTE — TELEPHONE ENCOUNTER
Called pt and told him that he needs to get the copy of his colonoscopy from [facility].  We can not release other clinics records.  He will request from them  Jade Way CMA

## 2021-06-14 NOTE — TELEPHONE ENCOUNTER
TAVO-  Martha Blanco results came back and it was positive. Report printed and placed on your desk.

## 2021-06-14 NOTE — TELEPHONE ENCOUNTER
Reason for Call:  Request for results:    Name of test or procedure: COLONOSCOPY     Date of test of procedure: 01/25/2021    Location of the test or procedure: [facility]    OK to leave the result message on voice mail or with a family member? Yes    Phone number Patient can be reached at: Home number on file [phone]    Additional comments: [facility] note in media tab. Patient is requesting to have his results sent to him.     Call taken on 2/2/2021 at 10:21 AM by Candelaria Ramos

## 2021-06-14 NOTE — TELEPHONE ENCOUNTER
I called and left Bill a message regarding his positive Cologuard and recommendation to pursue a screening colonoscopy with [facility].  I am sending a referral but also provided him with the phone number to call to schedule.   Patient presented after waiting 30 minutes with no reaction to allergy injections. Discharged from clinic.    Elsie Barrios RN

## 2021-06-15 PROBLEM — L71.9 ROSACEA: Status: ACTIVE | Noted: 2017-01-17

## 2021-06-15 PROBLEM — D17.1 LIPOMA OF TORSO: Status: ACTIVE | Noted: 2017-01-17

## 2021-06-15 PROBLEM — Z87.39 HISTORY OF GOUT: Status: ACTIVE | Noted: 2017-01-17

## 2021-06-15 NOTE — TELEPHONE ENCOUNTER
Refill Request  Medication name:   Requested Prescriptions     Pending Prescriptions Disp Refills     atorvastatin (LIPITOR) 10 MG tablet 90 tablet 3     Sig: Take 1 tablet (10 mg total) by mouth daily.     Who prescribed the medication:   Last refill on medication: 11-28-20  Requested Pharmacy: [facility]  Last appointment with PCP: 12/11/20  Next appointment: Appointment scheduled for 6-11-21    Grisel Del Toro

## 2021-06-16 PROBLEM — N52.9 ED (ERECTILE DYSFUNCTION): Status: ACTIVE | Noted: 2020-12-11

## 2021-06-16 PROBLEM — Z72.0 TOBACCO ABUSE: Status: ACTIVE | Noted: 2020-12-11

## 2021-06-16 NOTE — TELEPHONE ENCOUNTER
Telephone Encounter by Ana Grider LPN at 1/21/2019  7:47 AM     Author: Ana Grider LPN Service: -- Author Type: Licensed Nurse    Filed: 1/21/2019  7:50 AM Encounter Date: 1/21/2019 Status: Signed    : Ana Grider LPN (Licensed Nurse)       Who is calling:  Patient  Reason for Call:  Following up on CT scan/fluid on the lung.  Mckinley Gibson MD was going to follow up with pulmonary to see what the next steps are and patient just hasn't heard anything.    Please advise.    See encounter from 1/15/2019:    Mckinley Gibson MD          1/15/19 5:16 PM   Note      I spoke with Bill regarding CT scan showing residual pleural effusion but significantly smaller although air bubbles remain.  He remains asymptomatic.  Will discuss with pulmonary.        Date of last appointment with primary care: 1/3/2019  Has the patient been recently seen:  Yes  Okay to leave a detailed message: Yes

## 2021-06-16 NOTE — TELEPHONE ENCOUNTER
Reason for Call:  Post J&J vaccine question      Detailed comments: No side effects from Alexandre [facility] Alexandre besides soreness from muscle at injection site. Received vaccine on 4/10 at our [place] location.     Pt calling as he was notified via social media that they are pulling the J&J vaccine due to blood clotting. Requesting advice/reassurance from PCP on if there is anything he should do. Take baby aspirin?    Phone Number Patient can be reached at: Cell number on file:    Telephone Information:   Mobile [phone]       Best Time: anytime    Can we leave a detailed message on this number?: Yes    Call taken on 4/13/2021 at 8:46 AM by Candelaria Ramos

## 2021-06-16 NOTE — TELEPHONE ENCOUNTER
Please tell Bill that I think the J&J vaccine is quite effective and he should get the first shot that he has access to.

## 2021-06-16 NOTE — TELEPHONE ENCOUNTER
Reason for Call:  Other     Detailed comments: Patient scheduled for J&J Covid vaccine 4/10  Hasn't researched these vaccines and is wondering if he so do this shot or if he should wait and get one of the 2 step vaccines.    Phone Number Patient can be reached at: Work number on file:  [phone] (work)    Best Time: any    Can we leave a detailed message on this number?: Yes    Call taken on 4/5/2021 at 9:39 AM by Laura L Goldberg

## 2021-06-16 NOTE — TELEPHONE ENCOUNTER
My understanding is that there are 6 cases of women having blood clots following administration of the vaccine out of 7 million people vaccinated.  Sounds like an extremely rare side effect and there is still some uncertainty whether it is definitely being caused by the vaccine or would have happened anyway.  He can certainly monitor for any unusual leg swelling or calf pain.  However, it sounds like the odds of being in a serious car accident this week are tremendously higher than having any blood clot from the vaccine if that is reassuring.

## 2021-06-16 NOTE — PROGRESS NOTES
Office Visit - Physical   Emile Osborne   58 y.o.  male    Date of visit: 3/1/2018  Physician: Mckinley Gibson MD     Assessment and Plan   1. Routine general medical examination at a health care facility  Immunizations are reviewed and he declines having a flu shot.  Living will discussed.  We discussed smoking cessation.  Discussed using alcohol in moderation.  Regular exercise discussed.  He declines having a colonoscopy but is open to having Cologuard.  Prostate exam is normal and I will check a PSA for prostate cancer screening.  He needs an annual eye exam.  Skin exam performed and recommending regular use of sunblock.  Hepatitis C antibody for screening.      - PSA, Annual Screen (Prostatic-Specific Antigen)    2. Essential hypertension  Blood pressure well controlled with current dose of lisinopril/HCTZ  - Hemoglobin  - Basic Metabolic Panel    3. Hyperlipidemia  Recheck lipid profile while on gemfibrozil.  Recommending aspirin 81 mg   - Lipid Cascade  - Hepatic Profile    4. Tobacco abuse  Discussed smoking cessation.  Recommending low-dose CT scan for lung cancer screening and discussed at today's visit  - CT Low Dose Lung Screening Chest; Future    5. Type 2 diabetes mellitus without complication, without long-term current use of insulin  So far diet controlled.  Trying to cut back on alcohol intake.  Discussed importance of regular exercise and weight loss.  He needs annual eye exam.  No peripheral neuropathy.  - Glycosylated Hemoglobin A1c  - Microalbumin, Random Urine    6. Lipoma of torso  Lipoma is slightly larger and becoming more bothersome.  He would like it removed and I will refer to surgery  - Ambulatory referral to General Surgery    7. Rosacea  Continue MetroGel    8. Encounter for hepatitis C screening test for low risk patient    - Hepatitis C Antibody (Anti-HCV)    Return in about 1 year (around 3/1/2019) for Annual physical.     Chief Complaint   Chief Complaint   Patient  presents with     Annual Exam     fasting        Patient Profile   Social History     Social History Narrative    Joseluis's    , 2 sons        Past Medical History   Patient Active Problem List   Diagnosis     Type 2 diabetes mellitus without complication     Hyperlipidemia     HTN (hypertension)     Lipoma of torso     History of gout     Rosacea     Tobacco abuse       Past Surgical History  He has a past surgical history that includes Varicose vein surgery; Knee arthroscopy; Posterior laminectomy / decompression cervical spine (2002); Colonoscopy; and Cataract extraction (2014).     History of Present Illness   This 58 y.o. old gentleman here for his annual physical.  Unfortunately is still smoking about a pack per week.  We discussed colon cancer screening.  He is reluctant to have a colonoscopy but will do Cologuard.  He needs his eye exam.  He is concerned about mass on his back that seems to be increasing in size.  It is bothersome.  This is a lipoma.  He would like it removed.    Review of Systems: A comprehensive review of systems was negative except as noted above.        Medications and Allergies   Current Outpatient Prescriptions   Medication Sig Dispense Refill     ascorbic acid, vitamin C, (ASCORBIC ACID WITH MARIA L HIPS) 500 MG tablet Take 500 mg by mouth daily.       aspirin 81 mg chewable tablet Chew 81 mg daily.       gemfibrozil (LOPID) 600 MG tablet TAKE 1 TABLET(600 MG) BY MOUTH TWICE DAILY 180 tablet 1     lisinopril-hydrochlorothiazide (PRINZIDE,ZESTORETIC) 20-12.5 mg per tablet TAKE 1 TABLET BY MOUTH DAILY 90 tablet 3     metroNIDAZOLE (METROGEL) 0.75 % gel Apply twice daily 45 g 11     multivitamin therapeutic tablet Take 1 tablet by mouth daily.       No current facility-administered medications for this visit.      No Known Allergies     Family and Social History   Family History   Problem Relation Age of Onset     Hypertension Mother      Hyperlipidemia Mother      COPD Mother       "Lymphoma Father         Social History   Substance Use Topics     Smoking status: Current Every Day Smoker     Packs/day: 1.00     Years: 35.00     Types: Cigarettes     Smokeless tobacco: Never Used      Comment: ONE PACK PER WEEK     Alcohol use Yes      Comment: 1-2/day        Physical Exam   General Appearance:   Well-appearing middle-aged male    /76  Pulse 67  Ht 5' 7.25\" (1.708 m)  Wt 210 lb (95.3 kg)  SpO2 98%  BMI 32.65 kg/m2    EYES: Eyelids, conjunctiva, and sclera were normal. Pupils were normal. Cornea, iris, and lens were normal bilaterally.  HEAD, EARS, NOSE, MOUTH, AND THROAT: Head and face were normal. Nose appearance was normal and there was no discharge. Oropharynx was normal.  NECK: Neck appearance was normal. There were no neck masses and the thyroid was not enlarged and no nodules are felt.  No lymphadenopathy.  RESPIRATORY: Breathing pattern was normal and the chest moved symmetrically.  Percussion/auscultatory percussion was normal.  Lung sounds were normal and there were no rales or wheezes.  CARDIOVASCULAR: Heart rate and rhythm were normal.  S1 and S2 were normal and there were no extra sounds or murmurs. Peripheral pulses in arms and legs were normal.  Jugular venous pressure was normal.  There was no peripheral edema.  No carotid bruits.  GASTROINTESTINAL: The abdomen was normal in contour.  Bowel sounds were present.  Percussion detected no organ enlargement or tenderness.  Palpation detected no tenderness, mass, or enlarged organs.   RECTAL/PROSTATE: No external lesions.  Sphincter tone normal.  No palpable rectal lesions.  Prostate normal size, smooth, nontender without palpable lesions.  MUSCULOSKELETAL: Skeletal configuration was normal and muscle mass was normal for age. Joint appearance was overall normal.  LYMPHATIC: There were no enlarged nodes.  SKIN/HAIR/NAILS: Skin color was normal.  There were no skin lesions.  Hair and nails were normal.  2 cm lipoma on right " posterior lateral back.  NEUROLOGIC: The patient was alert and oriented to person, place, time, and circumstance. Speech was normal. Cranial nerves were normal. Motor strength was normal for age. The patient was normally coordinated.  Sensation intact.  Diabetic foot exam: Good pedal pulses.  Normal vibratory sensation and no other abnormality  PSYCHIATRIC:  Mood and affect were normal and the patient had normal recent and remote memory. The patient's judgment and insight were normal.       Additional Information        Mckinley Gibson MD  Internal Medicine  Contact me at [phone]

## 2021-06-16 NOTE — PROGRESS NOTES
HPI: I am consulted by Mckinley Gibson MD in this 58 y.o. male regarding a lipoma. This is on the right lateral chest. This has been present for 3-4 years. It has been growing. It is associated with mild discomfort.      Allergies   Allergen Reactions     Hay Fever And Allergy Relief          Current Outpatient Prescriptions:      ascorbic acid, vitamin C, (ASCORBIC ACID WITH MARIA L HIPS) 500 MG tablet, Take 500 mg by mouth daily., Disp: , Rfl:      aspirin 81 mg chewable tablet, Chew 81 mg daily., Disp: , Rfl:      gemfibrozil (LOPID) 600 MG tablet, TAKE 1 TABLET(600 MG) BY MOUTH TWICE DAILY, Disp: 180 tablet, Rfl: 3     lisinopril-hydrochlorothiazide (PRINZIDE,ZESTORETIC) 20-12.5 mg per tablet, TAKE 1 TABLET BY MOUTH DAILY, Disp: 90 tablet, Rfl: 3     metroNIDAZOLE (METROGEL) 0.75 % gel, Apply twice daily, Disp: 45 g, Rfl: 11     multivitamin therapeutic tablet, Take 1 tablet by mouth daily., Disp: , Rfl:     Past Medical History:   Diagnosis Date     Gross hematuria 2003    hematospermia/gross hematuria Normal cystoscopy, normal CT abd discontinued aspirin     Headache, post-traumatic 2010    CT negative after fall     History of gout 01/17/2017    Gout exacerbation 2012     HTN (hypertension)      Hyperlipidemia      Lipoma of torso 1/17/2017    5cm Jan 2017     Rosacea 1/17/2017     Tobacco abuse 01/17/2017    Low-dose CT chest negative for pulmonary nodules January 2017     Type 2 diabetes mellitus without complication        Past Surgical History:   Procedure Laterality Date     CATARACT EXTRACTION  2014     COLONOSCOPY      Normal colonoscopy 2003, has not had repeated since turning age 50 despite recommendation     KNEE ARTHROSCOPY      X 2     POSTERIOR LAMINECTOMY / DECOMPRESSION CERVICAL SPINE  2002     VARICOSE VEIN SURGERY         Social History     Social History     Marital status:      Spouse name: N/A     Number of children: N/A     Years of education: N/A     Occupational History     Not  "on file.     Social History Main Topics     Smoking status: Current Every Day Smoker     Packs/day: 1.00     Years: 35.00     Types: Cigarettes     Smokeless tobacco: Never Used      Comment: ONE PACK PER WEEK     Alcohol use Yes      Comment: 1-2/day     Drug use: No     Sexual activity: Not on file     Other Topics Concern     Not on file     Social History Narrative    Joseluis's    , 2 sons       /86 (Patient Site: Left Arm, Patient Position: Sitting, Cuff Size: Adult Large)  Pulse 72  Ht 5' 7.5\" (1.715 m)  Wt 208 lb 12.8 oz (94.7 kg)  SpO2 97%  BMI 32.22 kg/m2  Body mass index is 32.22 kg/(m^2).    EXAM:  GENERAL: Well developed male in no distress.  CHEST: 4cm mobile subcutaneous mass right chest wall. Has the feel of a lipoma. It extends to the rib level.    Assessment/Plan: Pt with a lipoma of the chest. He would like to have this removed, and we will do so under local anesthetic. I discussed this with him. I discussed the small risk of bleeding and infection with the patient.   Will get this scheduled.   I also discussed with him that his Stop-bang survey shows him to be at intermediate risk for sleep apnea. He declined to investigate it further with a sleep study.        Asher Rangel MD  [facility] Department of Surgery  "

## 2021-06-16 NOTE — TELEPHONE ENCOUNTER
Patient notified of clinician's message and verbalized understanding. No further questions at this time.   Nicolasa Lane CMA ............... 12:41 PM, 04/13/21

## 2021-06-17 NOTE — PROGRESS NOTES
Excision right chest lipoma  Date/Time: 4/3/2018 9:12 AM  Performed by: KETAN DALEY  Authorized by: KETAN DALEY     Consent:     Consent obtained:  Verbal    Consent given by:  Patient    Alternatives discussed:  No treatment  Pre-procedure details:     Skin preparation:  Betadine  Anesthesia (see MAR for exact dosages):     Anesthesia method:  Local infiltration    Local anesthetic:  Lidocaine 1% w/o epi  Post-procedure details:     Patient tolerance of procedure:  Tolerated well, no immediate complications  Comments:      The procedure was performed under 1% plain lidocaine local anesthetic.  I made an incision over the lipoma, and established hemostasis.  I dissected down to the plane of the lipoma, then freed it up within its plane.  I extruded from the subcutaneous tissues.  There appeared to be another portion of it remaining which I removed as well.  It measured 4.2 x 2.8 x 2.2 cm.  I assured hemostasis and closed the incision in layers with a 3-0 Monocryl subcutaneous suture and a 4-0 Vicryl subcuticular stitch.    The patient tolerated the procedure well.  There were no complications.  The blood loss was minimal, and the patient left the operating room in stable condition.

## 2021-06-18 NOTE — LETTER
Letter by Juan Cassidy MD at      Author: Juan Cassidy MD Service: -- Author Type: --    Filed:  Encounter Date: 2/19/2019 Status: (Other)       Mckinley Gibson MD  [address] [place] [id number]                                  February 19, 2019    Patient: Emile Osborne   MR Number: [id number]   YOB: 1960   Date of Visit: 2/19/2019     Dear Dr. Paige MD:    Thank you for referring Emile Osborne to me for evaluation. Below are the relevant portions of my assessment and plan of care.    If you have questions, please do not hesitate to call me. I look forward to following Emile along with you.    Sincerely,        Juan Cassidy MD          CC  No Recipients  Juan Cassidy MD  2/19/2019  8:51 AM  Sign at close encounter  Assessment and Plan:  59 year old recent smoker (quit 12/11/2018) with most pertinent history of HTN, who was admitted 12/12-28/2018 w/ pleurisy and dyspnea secondary to likely pneumonia associated w/ a complicated R-sided parapneumonic effusion s/p right pleural pigtail catheter placement.  He states that his shortness of breath has greatly improved since his hospitalization, however is not quite back to his baseline.  He still gets shortness of breath with exertion and cold air.  He recently had an x-ray followed by a a CT scan ordered by his primary care physician which shows a small amount of recurring fluid in the posterior portion of the right thoracic cavity.  Pleural fluid cultures and cytology during his hospitalization were negative.  Sputum culture grew usual martinez, suggesting either contamination with saliva or an aspiration etiology of his pneumonia.  The patient reports a history of a hiatal hernia and heartburn, particularly with spicy foods.  He sleeps flat at night, and often wakes up with sweating and chills.       I suspect that the initial cause of his pneumonia prompting admission  in December was nocturnal reflux aspiration, which is continuing to occur.  It is unlikely that the fluid was not completely drained due to lack of evidence of residual fluid on his last chest x-ray during hospitalization.  He was a smoker until this last hospitalization (having successfully quit since then) and does report a family history of lmphoma, hence a malignant effusion is possible, however less likely.  He did have a right paratracheal lymph node thought to be reactive, seen on chest CT scan during hospitalization and follow-up that was unchanged.    On 2/19/2019 follow-up visit, the patient states that he has been following the reflux aspiration precautions and that his morning cough has improved somewhat.  He does still occasionally cough up thick large mucous plugs.  His wife has also totaled him that he is no longer snoring.  He continues to abstain from smoking    1/24/2019 outpatient right-sided ultrasound thoracentesis: Only 4 mL's of purulent bloody fluid could be removed.  Culture results are negative thus far, cytology did not show evidence of malignancy     1/24/2019 chest CT (performed immediately after thoracentesis): Slightly decreased but persistent loculated right pleural effusion with adjacent inflammation and bronchiectasis in the right basilar airways     2/18/2019 PFTs: Consistent with mild restrictive disease, likely at least partially secondary to compression from loculated right pleural effusion.  Study did not meet criteria for obstruction, and diffusing capacity was normal    1) continue precautions for reflux aspiration (started 1/2019):  - avoid trigger foods (spicy foods, alcohol & caffeine)  - avoid eating or drinking within 3 hours of bedtime  - start famotidine twice daily  - elevate head of bed with wedge/reflux pillow or blocks under front 2 legs     2) follow-up with thoracic surgeon Dr. Marin for consideration of VATS decortication of loculated effusion     3) flutter  valve provided in clinic with teaching today to use 2-3 times daily to aid with airway clearance of bronchiectasis in right lower lobe gravity dependent distribution, likely secondary to chronic nocturnal reflux aspiration     4) patient is receiving annual CT screening for lung cancer through his primary care provider Dr. Meza,, may continue this with him     5) Patient congratulated on continuing his smoking abstinence since December 2018.  Approximately 3 minutes were spent on smoking cessation counseling including reviewing the risks of smoking     6) follow-up with myself in 3 months with repeat chest x-ray to evaluate for change in loculated effusion           CCx: peristent pleural effusion    HPI: Mr Osborne states that he has been following the reflux aspiration precautions and that his morning cough has improved somewhat.  He does still occasionally cough up thick large mucous plugs.  His wife has also totaled him that he is no longer snoring.  He continues to abstain from smoking.  His breathing is not significantly changed from last visit, he still does not feel back to his previous baseline before his pneumonia.    ROS:  A review of 12 organ systems was performed with pertinent positives and negatives noted in the HPI.      Current Meds:  Current Outpatient Medications   Medication Sig Note   ? acetaminophen (TYLENOL) 500 MG tablet Take 1-2 tablets (500-1,000 mg total) by mouth every 4 (four) hours as needed.    ? ascorbic acid, vitamin C, (ASCORBIC ACID WITH MARIA L HIPS) 500 MG tablet Take 500 mg by mouth daily. 3/20/2018: Take as needed during flu season   ? aspirin 81 mg chewable tablet Chew 81 mg daily.    ? blood glucose meter (ONETOUCH ULTRAMINI) Use 1 each As Directed daily Dispense glucometer brand per patient's insurance at pharmacy discretion..    ? blood glucose test strips Use 1 each As Directed daily Dispense brand per patient's insurance at pharmacy discretion..    ? famotidine (PEPCID) 20  MG tablet Take 1 tablet (20 mg total) by mouth 2 (two) times a day.    ? gemfibrozil (LOPID) 600 MG tablet TAKE 1 TABLET(600 MG) BY MOUTH TWICE DAILY    ? generic lancets (ACCU-CHEK SOFTCLIX LANCETS) Test once daily Dispense brand per patient's insurance at pharmacy discretion..    ? lisinopril-hydrochlorothiazide (PRINZIDE,ZESTORETIC) 20-12.5 mg per tablet TAKE 1 TABLET BY MOUTH DAILY    ? metroNIDAZOLE (METROGEL) 0.75 % gel Apply 1 application topically 2 (two) times a day Apply twice daily.    ? multivitamin therapeutic tablet Take 1 tablet by mouth daily.        Labs:  Recent Results (from the past 72 hour(s))   Hemoglobin   Result Value Ref Range    Hemoglobin 14.4 14.0 - 18.0 g/dL       I have personally reviewed all pertinent imaging studies and PFT results unless otherwise noted.    Imaging studies:  No results found.      Physical Exam:  /82   Pulse (!) 56   Resp 24   Wt 214 lb 3.2 oz (97.2 kg)   SpO2 99% Comment: RA  BMI 31.63 kg/m     General - Well nourished  Ears/Mouth -  OP pink moist, no thrush  Neck - no cervical lymphadenopathy  Lungs - Clear to ausculation bilaterally, no wheezes  CVS - regular rhythm with no murmurs, rubs or gallups  Abdomen - soft, NT, ND, NABS  Ext - no cyanosis, clubbing or edema  Skin - no rash  Psychology - alert and oriented, answers appropriate        Electronically signed by:    Fabian Cassidy MD  [facility]

## 2021-06-18 NOTE — LETTER
Letter by Mckinley Gibson MD at      Author: Mckinley Gibson MD Service: -- Author Type: --    Filed:  Encounter Date: 1/4/2019 Status: (Other)       Emile Osborne  [address]  [facility] [place] [address]             January 4, 2019         Dear Charly,    Below are the results from your recent visit:    Resulted Orders   Lipid Cascade   Result Value Ref Range    Cholesterol 166 <=199 mg/dL    Triglycerides 194 (H) <=149 mg/dL    HDL Cholesterol 35 (L) >=40 mg/dL    LDL Calculated 92 <=129 mg/dL    Patient Fasting > 8hrs? Yes    HM2(CBC w/o Differential)   Result Value Ref Range    WBC 9.1 4.0 - 11.0 thou/uL    RBC 3.98 (L) 4.40 - 6.20 mill/uL    Hemoglobin 11.5 (L) 14.0 - 18.0 g/dL    Hematocrit 33.9 (L) 40.0 - 54.0 %    MCV 85 80 - 100 fL    MCH 28.8 27.0 - 34.0 pg    MCHC 33.8 32.0 - 36.0 g/dL    RDW 11.6 11.0 - 14.5 %    Platelets 352 140 - 440 thou/uL    MPV 7.4 7.0 - 10.0 fL       Triglycerides looking better than last time.  The rest of the cholesterol looks good.    Please call with questions or contact us using [employer].    Sincerely,      Electronically signed by Mckinley Gibson MD

## 2021-06-18 NOTE — LETTER
Letter by Dc Marin MD at      Author: Dc Marin MD Service: -- Author Type: --    Filed:  Encounter Date: 2/28/2019 Status: (Other)       Mckinley Gibson MD  [address] [place] [id number]                                  February 28, 2019    Patient: Emile Osborne   MR Number: [id number]   YOB: 1960   Date of Visit: 2/28/2019     Dear Dr. Paige MD:    Thank you for referring Emile Osborne to me for evaluation. Below are the relevant portions of my assessment and plan of care.    If you have questions, please do not hesitate to call me. I look forward to following Emile along with you.    Sincerely,        Dc Marin MD          CC  MD Tomas Calvo Rafael S, MD  2/28/2019 10:04 AM  Sign at close encounter  THORACIC SURGERY OFFICE NEW PATIENT VISIT      Dear Dr. Gibson,    I saw Mr. Osborne at Dr. Thrasher request in consultation for the evaluation and treatment of a small chronic loculated RIGHT pleural effusion.     HPI  Mr. Emile Osborne is a 59 y.o. year-old male with a residual small parapneumonic effusion after an admission for pneumonia on 12/12/2018. His exercise tolerance is almost at baseline, and he continues to cough up some mucous plugs on a daily basis.     Tests   PFT (2/18/2019): FEV1  67% (no change with BD), DLCO 86%  CT chest (2/4/2019): smaller than before      Thoracentesis (small amount of fluid, 2/4/2019): all cultures, stains, and cytology negative; thick fluid, many PMN    CT chest (12/14/2018):         PMH    Past Medical History:   Diagnosis Date   ? Gross hematuria 2003    hematospermia/gross hematuria Normal cystoscopy, normal CT abd discontinued aspirin   ? Headache, post-traumatic 2010    CT negative after fall   ? History of gout 01/17/2017    Gout exacerbation 2012   ? HTN (hypertension)    ? Hyperlipidemia    ? Lipoma of torso 1/17/2017    5cm Jan 2017   ? Pleural effusion associated  with pulmonary infection     Hospitalized with community-acquired pneumonia and parapneumonic effusion treated with chest tube and IV antibiotics   ? Rosacea 1/17/2017   ? Tobacco abuse 01/17/2017    Low-dose CT chest negative for pulmonary nodules January 2017   ? Type 2 diabetes mellitus without complication (H)         Past Surgical History:   Procedure Laterality Date   ? CATARACT EXTRACTION  2014   ? COLONOSCOPY      Normal colonoscopy 2003, has not had repeated since turning age 50 despite recommendation   ? IR PLEURAL DRAINAGE W CATHETER INSERTION  12/14/2018   ? KNEE ARTHROSCOPY      X 2   ? POSTERIOR LAMINECTOMY / DECOMPRESSION CERVICAL SPINE  2002   ? US THORACENTESIS  12/13/2018   ? US THORACENTESIS  2/4/2019   ? VARICOSE VEIN SURGERY         ETOH 2 beers/day  TOB 35 pack years, quit 12/12/2018    Physical examination  BMI 31  Non-contributory    From a personal perspective, he is here with his wife, Kaci. He just had to permanently transfer his mother to a NH after a fall, and it has taken a toll on him.      IMPRESSION   1. Pleural effusion  CT Chest Without Contrast      59 y.o. year-old male with a small chronic loculated RIGHT pleural effusion    PLAN  I spent a total of 30 minutes with Mr. Love, more than 50% of which were spent in counseling, coordination of care, and face-to-face time. I reviewed the plan as follows:    1) Follow-up in 2 month with a repeat chest CT     2) Exercise    3) I recommended weight loss if possible    They had all their questions answered and were in agreement with the plan.  I appreciate the opportunity to participate in the care of your patient and will keep you updated.    Sincerely,

## 2021-06-18 NOTE — LETTER
Letter by Juan Cassidy MD at      Author: Juan Cassidy MD Service: -- Author Type: --    Filed:  Encounter Date: 1/24/2019 Status: (Other)       Mckinley Gibson MD  [address] [place] [id number]                                  January 24, 2019    Patient: Emile Osborne   MR Number: [id number]   YOB: 1960   Date of Visit: 1/24/2019     Dear Dr. Paige MD:    Thank you for referring Emile Osborne to me for evaluation. Below are the relevant portions of my assessment and plan of care.    If you have questions, please do not hesitate to call me. I look forward to following Emile along with you.    Sincerely,        Juan Cassidy MD          CC  No Recipients  Juan Cassidy MD  1/24/2019 10:18 AM  Sign at close encounter  Assessment and Plan:  58 year old current smoker with most pertinent history of HTN, who was admitted 12/12-28/2018 w/ pleurisy and dyspnea secondary to likely pneumonia associated w/ a complicated R-sided parapneumonic effusion s/p right pleural pigtail catheter placement.  He states that his shortness of breath has greatly improved since his hospitalization, however is not quite back to his baseline.  He still gets shortness of breath with exertion and cold air.  He recently had an x-ray followed by a a CT scan ordered by his primary care physician which shows a small amount of recurring fluid in the posterior portion of the right thoracic cavity.  Pleural fluid cultures and cytology during his hospitalization were negative.  Sputum culture grew usual martinez, suggesting either contamination with saliva or an aspiration etiology of his pneumonia.  The patient reports a history of heartburn, particularly with spicy foods.  He sleeps flat at night, and often wakes up with sweating and chills.      I suspect that the initial cause of his pneumonia prompting admission in December was nocturnal reflux  aspiration, which is continuing to occur.  It is unlikely that the fluid was not completely drained due to lack of evidence of residual fluid on his last chest x-ray during hospitalization.  He was a smoker until this last hospitalization (having successfully quit since then) and does report a family history of lmphoma, hence a malignant effusion is possible, however less likely.  He did have a right paratracheal lymph node thought to be reactive, seen on chest CT scan during hospitalization and follow-up that was unchanged.      1) For reflux aspiration:  - avoid trigger foods (spicy foods, alcohol & caffeine)  - avoid eating or drinking within 3 hours of bedtime  - start famotidine twice daily  - elevate head of bed with wedge/reflux pillow or blocks under front 2 legs    2) Right ultrasound thoracentesis, labs ordered    3) Repeat chest CT after thoracentesis to evaluate completeness of fluid evacuation    4) Clinic follow-up in 2 weeks with Pulmonary Function tests.  Will consider surgical referral for VATS at that time if significant pleural fluid remain    5) Patient congratulated on quitting smoking since his discharged and was encouraged to continue.        CCx: Recurrent right pleural effusion    HPI: Mr Osborne states that his shortness of breath has greatly improved since his hospitalization, however is not quite back to his baseline.  He still gets shortness of breath with exertion and cold air. He reports a history of heartburn, particularly with spicy foods.  He sleeps flat at night, and often wakes up with sweating and chills.     ROS:  A review of 12 organ systems was performed with pertinent positives and negatives noted in the HPI.      Current Meds:  Current Outpatient Medications   Medication Sig Note   ? acetaminophen (TYLENOL) 500 MG tablet Take 1-2 tablets (500-1,000 mg total) by mouth every 4 (four) hours as needed.    ? ascorbic acid, vitamin C, (ASCORBIC ACID WITH MARIA L HIPS) 500 MG tablet  Take 500 mg by mouth daily. 3/20/2018: Take as needed during flu season   ? blood glucose meter (ONETOUCH ULTRAMINI) Use 1 each As Directed daily Dispense glucometer brand per patient's insurance at pharmacy discretion..    ? blood glucose test strips Use 1 each As Directed daily Dispense brand per patient's insurance at pharmacy discretion..    ? gemfibrozil (LOPID) 600 MG tablet TAKE 1 TABLET(600 MG) BY MOUTH TWICE DAILY    ? generic lancets (ACCU-CHEK SOFTCLIX LANCETS) Test once daily Dispense brand per patient's insurance at pharmacy discretion..    ? lisinopril-hydrochlorothiazide (PRINZIDE,ZESTORETIC) 20-12.5 mg per tablet TAKE 1 TABLET BY MOUTH DAILY    ? metroNIDAZOLE (METROGEL) 0.75 % gel Apply 1 application topically 2 (two) times a day Apply twice daily.    ? multivitamin therapeutic tablet Take 1 tablet by mouth daily.    ? aspirin 81 mg chewable tablet Chew 81 mg daily.    ? famotidine (PEPCID) 20 MG tablet Take 1 tablet (20 mg total) by mouth 2 (two) times a day.        Labs:  No results found for this or any previous visit (from the past 72 hour(s)).    I have personally reviewed all pertinent imaging studies and PFT results unless otherwise noted.    Imaging studies:  Ct Chest With Contrast    Result Date: 1/15/2019  [facility] RADIOLOGY EXAM: CT CHEST W CONTRAST LOCATION: [facility] DATE/TIME: 1/15/2019 8:53 AM INDICATION: Persistent infiltrates on chest x-ray with pleural effusion COMPARISON: 12/14/2018 TECHNIQUE: Helical images were obtained through the chest during injection of IV contrast. Multiplanar reformats were obtained. Dose reduction techniques were used. IV CONTRAST: Iohexol (Omni) 100 mL FINDINGS: LUNGS AND PLEURA: Interval right chest tube drainage with substantial decrease size of the posterior loculated pleural collection but fluid and air bubbles remain; the collection has maximal dimensions of 6.5 x 2.5 cm on axial images and 9 cm sagittal images ((this compares to 16.5 x  "10.5 x 7 cm on the prior study. Modestly thickened enhancing pleura surrounds the residual collection. On prior study there were multiple additional small loculated collections on all of these have resolved. Right lung much better aerated but there remains adjacent developing rounded atelectasis at right base. Left lung is clear. MEDIASTINUM: Reactive right paratracheal lymph node near the thoracic inlet unchanged. LIMITED UPPER ABDOMEN: Mild diffuse fatty infiltration of liver. Stable left renal cyst. MUSCULOSKELETAL: Negative.     CONCLUSION: 1.  Significant improvement after drainage of complex pleural collection though a small complex collection of air and fluid persist within posterior inferior right pleural space. There is associated adjacent atelectasis or fibrosis.        Physical Exam:  /80   Pulse 64   Resp 12   Ht 5' 9\" (1.753 m)   Wt 211 lb (95.7 kg)   SpO2 98%   BMI 31.16 kg/m     General - Well nourished  Ears/Mouth -  OP pink moist, no thrush  Neck - no cervical lymphadenopathy  Lungs - Clear to ausculation bilaterally.  Mild decreased breath sounds on the right.  CVS - regular rhythm with no murmurs, rubs or gallups  Abdomen - soft, NT, ND, NABS  Ext - no cyanosis or clubbing, trace edema  Skin - no rash  Psychology - alert and oriented, answers appropriate        Electronically signed by:    Fabian Cassidy MD  [facility] Pulmonary and Critical Care Medicine       "

## 2021-06-19 NOTE — LETTER
Letter by Dc Marin MD at      Author: Dc Marin MD Service: -- Author Type: --    Filed:  Encounter Date: 4/25/2019 Status: (Other)         Mckinley Gibson MD  [address] [place] [id number]                                  April 25, 2019    Patient: Emile Osborne   MR Number: [id number]   YOB: 1960   Date of Visit: 4/25/2019     Dear Dr. Paige MD:    Thank you for referring Emile Osborne to me for evaluation. Below are the relevant portions of my assessment and plan of care.    If you have questions, please do not hesitate to call me. I look forward to following Emile along with you.    Sincerely,        Dc Marin MD            MD Tomas Calvo Rafael S, MD  4/25/2019  2:17 PM  Sign at close encounter  THORACIC SURGERY OFFICE ESTABLISHED PATIENT VISIT       Dear Dr. Gibson,     I saw Mr. Osborne  in follow-up for the evaluation and treatment of a small chronic loculated RIGHT pleural effusion.      HPI  Mr. Emile Osborne is a 59 y.o. year-old male with a residual small parapneumonic effusion after an admission for pneumonia on 12/12/2018. His exercise tolerance is almost at baseline, and he continues to cough up some mucous plugs on a daily basis, but he feels significantly better than 2 months ago. Still not smoking.     Tests   PFT (2/18/2019): FEV1  67% (no change with BD), DLCO 86%  CT chest (4/25/2019): significant improvement    CT chest (2/4/2019): smaller than before       Thoracentesis (small amount of fluid, 2/4/2019): all cultures, stains, and cytology negative; thick fluid, many PMN     CT chest (12/14/2018):           PMH          Past Medical History:   Diagnosis Date   ? Gross hematuria 2003     hematospermia/gross hematuria Normal cystoscopy, normal CT abd discontinued aspirin   ? Headache, post-traumatic 2010     CT negative after fall   ? History of gout 01/17/2017     Gout exacerbation 2012   ?  HTN (hypertension)     ? Hyperlipidemia     ? Lipoma of torso 1/17/2017     5cm Jan 2017   ? Pleural effusion associated with pulmonary infection       Hospitalized with community-acquired pneumonia and parapneumonic effusion treated with chest tube and IV antibiotics   ? Rosacea 1/17/2017   ? Tobacco abuse 01/17/2017     Low-dose CT chest negative for pulmonary nodules January 2017   ? Type 2 diabetes mellitus without complication (H)                 Past Surgical History:   Procedure Laterality Date   ? CATARACT EXTRACTION   2014   ? COLONOSCOPY         Normal colonoscopy 2003, has not had repeated since turning age 50 despite recommendation   ? IR PLEURAL DRAINAGE W CATHETER INSERTION   12/14/2018   ? KNEE ARTHROSCOPY         X 2   ? POSTERIOR LAMINECTOMY / DECOMPRESSION CERVICAL SPINE   2002   ? US THORACENTESIS   12/13/2018   ? US THORACENTESIS   2/4/2019   ? VARICOSE VEIN SURGERY             ETOH 2 beers/day  TOB 35 pack years, quit 12/12/2018     Physical examination  BMI 31  Non-contributory     From a personal perspective, he is here with his wife, Kaci. He just had to permanently transfer his mother to a NH after a fall, and it has taken a toll on him.        IMPRESSION   1. Pleural effusion  CT Chest Without Contrast      59 y.o. year-old male with a small chronic loculated RIGHT pleural effusion     PLAN  I spent a total of  10 minutes with Mr. Osborne and Kaci, more than 50% of which were spent in counseling, coordination of care, and face-to-face time. I reviewed the plan as follows:     1) Follow-up in 3 months with a repeat chest CT  - after that long-term follow-up with Lc Cassidy and Paige     2) Exercise     3) I recommended weight loss if possible     They had all their questions answered and were in agreement with the plan.  I appreciate the opportunity to participate in the care of your patient and will keep you updated.     Sincerely,

## 2021-06-19 NOTE — LETTER
Letter by Dc Marin MD at      Author: Dc Marin MD Service: -- Author Type: --    Filed:  Encounter Date: 7/18/2019 Status: (Other)         Mckinley Gibson MD  [address] [place] [id number]                                  July 18, 2019    Patient: Emile Osborne   MR Number: [id number]   YOB: 1960   Date of Visit: 7/18/2019     Dear Dr. Paige MD:    Thank you for referring Emile Osborne to me for evaluation. Below are the relevant portions of my assessment and plan of care.    If you have questions, please do not hesitate to call me. I look forward to following Emile along with you.    Sincerely,        Dc Marin MD          CC  No Recipients  Dc Marin MD  7/18/2019 10:07 AM  Sign at close encounter  THORACIC SURGERY OFFICE ESTABLISHED PATIENT VISIT       Dear Dr. Gibson,     I saw Mr. Osborne in follow-up for the evaluation and treatment of a small chronic loculated RIGHT pleural effusion.      HPI  Mr. Emile Osborne is a 59 y.o. year-old male with a residual small parapneumonic effusion after an admission for pneumonia on 12/12/2018. His exercise tolerance is almost at baseline, and he continues to cough up some mucous plugs in the mornings. Overall he is much improved and pleased with his progress.        Tests   PFT (2/18/2019): FEV1  67% (no change with BD), DLCO 86%  CT chest (7/18/2019): almost complete resolution, some rounded atelectasis      CT chest (4/25/2019): significant improvement    CT chest (2/4/2019): smaller than before       Thoracentesis (small amount of fluid, 2/4/2019): all cultures, stains, and cytology negative; thick fluid, many PMN     CT chest (12/14/2018):           PMH             Past Medical History:   Diagnosis Date   ? Gross hematuria 2003     hematospermia/gross hematuria Normal cystoscopy, normal CT abd discontinued aspirin   ? Headache, post-traumatic 2010     CT negative after fall   ?  History of gout 01/17/2017     Gout exacerbation 2012   ? HTN (hypertension)     ? Hyperlipidemia     ? Lipoma of torso 1/17/2017     5cm Jan 2017   ? Pleural effusion associated with pulmonary infection       Hospitalized with community-acquired pneumonia and parapneumonic effusion treated with chest tube and IV antibiotics   ? Rosacea 1/17/2017   ? Tobacco abuse 01/17/2017     Low-dose CT chest negative for pulmonary nodules January 2017   ? Type 2 diabetes mellitus without complication (H)                     Past Surgical History:   Procedure Laterality Date   ? CATARACT EXTRACTION   2014   ? COLONOSCOPY         Normal colonoscopy 2003, has not had repeated since turning age 50 despite recommendation   ? IR PLEURAL DRAINAGE W CATHETER INSERTION   12/14/2018   ? KNEE ARTHROSCOPY         X 2   ? POSTERIOR LAMINECTOMY / DECOMPRESSION CERVICAL SPINE   2002   ? US THORACENTESIS   12/13/2018   ? US THORACENTESIS   2/4/2019   ? VARICOSE VEIN SURGERY             ETOH 2 beers/day  TOB 35 pack years, quit 12/12/2018     Physical examination  BMI 31  Non-contributory     From a personal perspective, he is here with his wife, Kaci. He just had to permanently transfer his mother to a NH after a fall, and it has taken a toll on him.        IMPRESSION   1. Pleural effusion  CT Chest Without Contrast      59 y.o. year-old male with a nearly completed resolved small chronic loculated RIGHT pleural effusion     PLAN  I spent a total of 10 minutes with Mr. Love, more than 50% of which were spent in counseling, coordination of care, and face-to-face time. I reviewed the plan as follows:     Follow-up prn with us  Routine health maintenance with PCP     They had all their questions answered and were in agreement with the plan.  I appreciate the opportunity to participate in the care of your patient and will keep you updated.     Sincerely,

## 2021-06-19 NOTE — LETTER
Letter by Mckinley Gibson MD at      Author: Mckinley Gibson MD Service: -- Author Type: --    Filed:  Encounter Date: 12/5/2019 Status: Signed           December 5, 2019      Dear Charly,      I hope you are doing well.  Dr. Marin has been keeping me up to date regarding the pleural effusion.     I was reviewing records and noticed that you are overdue for an annual physical.  Please get this scheduled in the next few months.    In addition, I would recommend changing your gemfibrozil to a different cholesterol-lowering medication, atorvastatin (Lipitor).  In the setting of type 2 diabetes, statins can lower your risk for future cardiovascular events including heart attacks and strokes.  I have included the prescription with this letter.  I would also recommend taking 81 mg of aspirin every day.    Sincerely,      Mckinley Gibson MD  Internal Medicine  [facility]

## 2021-06-20 NOTE — LETTER
Letter by Mckinley Gibson MD at      Author: Mckinley Gibson MD Service: -- Author Type: --    Filed:  Encounter Date: 7/23/2020 Status: (Other)         Emile Osborne  [address]  [facility] [address]             July 23, 2020         Dear Charly,    Below are the results from your recent visit:    Resulted Orders   Glycosylated Hemoglobin A1c   Result Value Ref Range    Hemoglobin A1c 6.1 (H) 3.5 - 6.0 %   Lipid Cascade FASTING   Result Value Ref Range    Cholesterol 203 (H) <=199 mg/dL    Triglycerides 668 (H) <=149 mg/dL    HDL Cholesterol 37 (L) >=40 mg/dL    LDL Calculated        Comment:      Invalid, Triglycerides >400    Patient Fasting > 8hrs? Yes    PSA, Annual Screen (Prostatic-Specific Antigen)   Result Value Ref Range    PSA 0.4 0.0 - 4.5 ng/mL    Narrative    Method is Abbott Prostate-Specific Antigen (PSA)  Standard-WHO 1st International (90:10)   Microalbumin, Random Urine   Result Value Ref Range    Microalbumin, Random Urine 5.81 (H) 0.00 - 1.99 mg/dL    Creatinine, Urine 79.8 mg/dL    Microalbumin/Creatinine Ratio Random Urine 72.8 (H) <=19.9 mg/g    Narrative    Microalbumin, Random Urine  <2.0 mg/dL . . . . . . . . Normal  3.0-30.0 mg/dL . . . . . . Microalbuminuria  >30.0 mg/dL . . . . . .  . Clinical Proteinuria    Microalbumin/Creatinine Ratio, Random Urine  <20 mg/g . . . . .. . . . Normal   mg/g . . . . . . . Microalbuminuria  >300 mg/g . . . . . . . . Clinical Proteinuria       Comprehensive Metabolic Panel   Result Value Ref Range    Sodium 138 136 - 145 mmol/L    Potassium 4.5 3.5 - 5.0 mmol/L    Chloride 100 98 - 107 mmol/L    CO2 27 22 - 31 mmol/L    Anion Gap, Calculation 11 5 - 18 mmol/L    Glucose 123 70 - 125 mg/dL    BUN 10 8 - 22 mg/dL    Creatinine 0.84 0.70 - 1.30 mg/dL    GFR MDRD Af Amer >60 >60 mL/min/1.73m2    GFR MDRD Non Af Amer >60 >60 mL/min/1.73m2    Bilirubin, Total 1.0 0.0 - 1.0 mg/dL    Calcium 10.1 8.5 - 10.5 mg/dL    Protein, Total  7.2 6.0 - 8.0 g/dL    Albumin 4.5 3.5 - 5.0 g/dL    Alkaline Phosphatase 74 45 - 120 U/L    AST 27 0 - 40 U/L    ALT 37 0 - 45 U/L    Narrative    Fasting Glucose reference range is 70-99 mg/dL per  American Diabetes Association (ADA) guidelines.   HM2(CBC w/o Differential)   Result Value Ref Range    WBC 9.4 4.0 - 11.0 thou/uL    RBC 4.74 4.40 - 6.20 mill/uL    Hemoglobin 14.7 14.0 - 18.0 g/dL    Hematocrit 42.6 40.0 - 54.0 %    MCV 90 80 - 100 fL    MCH 30.9 27.0 - 34.0 pg    MCHC 34.4 32.0 - 36.0 g/dL    RDW 11.7 11.0 - 14.5 %    Platelets 192 140 - 440 thou/uL    MPV 7.4 7.0 - 10.0 fL   Custom LDL Cholesterol, Direct   Result Value Ref Range    Direct LDL 76 <=129 mg/dl       Atorvastatin is keeping your LDL cholesterol under control but your triglycerides are getting dangerously high.  Triglycerides over 500 can contribute to heart disease and can also increase your risk for pancreatitis.  I would like you to restart gemfibrozil but take only once daily in combination with the atorvastatin.  There is a small increased risk of muscle toxicity when combining gemfibrozil with a statin.  However, I believe the benefits outweigh these risks.  Certainly monitor for any unusual muscle pain and let me know immediately.  I would recheck the lipid profile in 6 months.    Diabetes control looks good with A1c of 6.1%.  PSA is reassuring for no prostate cancer.  No anemia with hemoglobin 14.7.  Normal kidney and liver studies.    Please call with questions or contact us using [employer].    Sincerely,        Electronically signed by Mckinley Gibson MD

## 2021-06-21 NOTE — LETTER
Letter by Mckinley Gibson MD at      Author: Mckinley Gibson MD Service: -- Author Type: --    Filed:  Encounter Date: 12/12/2020 Status: (Other)         Emile Osborne  [address]  [facility] [place] [id number]             December 12, 2020         Dear Charly,    Below are the results from your recent visit:    Resulted Orders   Glycosylated Hemoglobin A1c   Result Value Ref Range    Hemoglobin A1c 6.2 (H) <=5.6 %   Lipid Cascade FASTING   Result Value Ref Range    Cholesterol 188 <=199 mg/dL    Triglycerides 375 (H) <=149 mg/dL    HDL Cholesterol 45 >=40 mg/dL    LDL Calculated 68 <=129 mg/dL    Patient Fasting > 8hrs? Yes    Comprehensive Metabolic Panel   Result Value Ref Range    Sodium 139 136 - 145 mmol/L    Potassium 3.8 3.5 - 5.0 mmol/L    Chloride 103 98 - 107 mmol/L    CO2 22 22 - 31 mmol/L    Anion Gap, Calculation 14 5 - 18 mmol/L    Glucose 111 70 - 125 mg/dL    BUN 9 8 - 22 mg/dL    Creatinine 0.78 0.70 - 1.30 mg/dL    GFR MDRD Af Amer >60 >60 mL/min/1.73m2    GFR MDRD Non Af Amer >60 >60 mL/min/1.73m2    Bilirubin, Total 1.0 0.0 - 1.0 mg/dL    Calcium 9.5 8.5 - 10.5 mg/dL    Protein, Total 7.2 6.0 - 8.0 g/dL    Albumin 4.5 3.5 - 5.0 g/dL    Alkaline Phosphatase 74 45 - 120 U/L    AST 20 0 - 40 U/L    ALT 22 0 - 45 U/L    Narrative    Fasting Glucose reference range is 70-99 mg/dL per  American Diabetes Association (ADA) guidelines.   Hemoglobin   Result Value Ref Range    Hemoglobin 14.3 14.0 - 18.0 g/dL   Uric Acid   Result Value Ref Range    Uric Acid 6.5 3.0 - 8.0 mg/dL   Magnesium   Result Value Ref Range    Magnesium 2.0 1.8 - 2.6 mg/dL   Urinalysis-UC if Indicated   Result Value Ref Range    Color, UA Yellow Colorless, Yellow, Straw, Light Yellow    Clarity, UA Clear Clear    Glucose, UA Negative Negative    Bilirubin, UA Negative Negative    Ketones, UA Negative Negative    Specific Gravity, UA 1.010 1.005 - 1.030    Blood, UA Negative Negative    pH, UA 5.5 5.0 - 8.0     Protein, UA Negative Negative mg/dL    Urobilinogen, UA 0.2 E.U./dL 0.2 E.U./dL, 1.0 E.U./dL    Nitrite, UA Negative Negative    Leukocytes, UA Negative Negative    Narrative    Microscopic not indicated  UC not indicated   CK Total   Result Value Ref Range    CK, Total 93 30 - 190 U/L       Triglycerides are still high but much improved from last time since starting gemfibrozil.  I would continue the current dose along with your Lipitor.    Diabetes remains well controlled with A1c of 6.2%.    Normal kidney and liver function.  No anemia.  Muscle enzyme CPK is normal.  Electrolytes including potassium and magnesium are normal.  Urinalysis looks fine.  Uric acid which causes gout is well controlled.    I again want to say how sorry I am about your mom.  She was a special lady and I will always remember her.    Please call with questions or contact us using [employer].    Sincerely,        Electronically signed by Mckinley Gibson MD

## 2021-06-22 NOTE — PROGRESS NOTES
Second attempt to reach patient for hospital discharge follow up.  No answer.  RN has made two unsuccessful attempts to reach patient.   Encounter closed.        Giovanna Stock RN Care Manager, Population Health

## 2021-06-22 NOTE — PROGRESS NOTES
Office Visit - Follow Up   Emile Osborne   58 y.o. male    Date of Visit: 1/3/2019    Chief Complaint   Patient presents with     Hospital Visit Follow Up        Assessment and Plan   1. Community acquired bacterial pneumonia with parapneumonic pleural effusion  Required placement of chest tube with significant improvement in pleural effusion.  Discharged home and completed antibiotic course.  Currently asymptomatic although lung sounds remain diminished at right base.  Will perform follow-up chest x-ray and consider CT scan if residual pleural effusion is seen.  Cytology negative as were cultures.  - XR Chest 2 Views  - HM2(CBC w/o Differential)        3. Tobacco abuse  Successfully quit smoking since hospitalization.  I am congratulating him on this and encouraging ongoing effort    4. Type 2 diabetes mellitus without complication, without long-term current use of insulin (H)  Persistent hyperglycemia after discharge although he may be using cough drops in the morning before checking his sugar which would certainly affect the results.  His A1c was 6.8% last month indicating better control than what is currently being seen.  He will continue to monitor and recheck A1c in 3 months when he returns    He will make sure to get an eye exam in the next year and will send the report to us.  We discussed the risks of diabetic retinopathy    5. Essential hypertension  Blood pressure looks well-controlled with current medication    6. Mixed hyperlipidemia  Recheck lipid profile on gemfibrozil  - Lipid Cascade        Return in about 6 months (around 7/3/2019) for Annual physical.     History of Present Illness   This 58 y.o. old male with hypertension, hyperlipidemia and type 2 diabetes recently hospitalized with community-acquired pneumonia complicated by parapneumonic effusion.  Required placement of chest tube placed to suction.  Resolution of pleural effusion and discharged home after chest tube pulled and completed  course of antibiotics.  He is feeling well.  No residual pleuritic pain.  Feels comfortable without dyspnea.  No orthopnea.  No fevers or chills.  Blood sugars which were elevated during hospitalization remain higher than expected.  Sugar is high as 197 when recently checked.  Diarrhea resolved.  He has successfully quit smoking since hospitalization.    Review of Systems:  Otherwise, a comprehensive review of systems was negative except as noted.     Medications, Allergies and Problem List   Patient Active Problem List   Diagnosis     Type 2 diabetes mellitus without complication (H)     Hyperlipidemia     HTN (hypertension)     Lipoma of torso     History of gout     Rosacea     Tobacco abuse     Parapneumonic effusion     Community acquired bacterial pneumonia     Tobacco dependence syndrome     Pleural effusion associated with pulmonary infection       He has a past surgical history that includes Varicose vein surgery; Knee arthroscopy; Posterior laminectomy / decompression cervical spine (2002); Colonoscopy; Cataract extraction (2014); US Thoracentesis (12/13/2018); and IR Pleural Drainage With Catheter Insertion (12/14/2018).    Allergies   Allergen Reactions     Hay Fever And Allergy Relief        Current Outpatient Medications   Medication Sig Dispense Refill     blood glucose meter (ONETOUCH ULTRAMINI) Use 1 each As Directed daily Dispense glucometer brand per patient's insurance at pharmacy discretion.. 1 each 3     blood glucose test strips Use 1 each As Directed daily Dispense brand per patient's insurance at pharmacy discretion.. 100 strip 3     gemfibrozil (LOPID) 600 MG tablet TAKE 1 TABLET(600 MG) BY MOUTH TWICE DAILY 180 tablet 3     generic lancets (ACCU-CHEK SOFTCLIX LANCETS) Test once daily Dispense brand per patient's insurance at pharmacy discretion.. 100 each 3     lisinopril-hydrochlorothiazide (PRINZIDE,ZESTORETIC) 20-12.5 mg per tablet TAKE 1 TABLET BY MOUTH DAILY 90 tablet 2      "metroNIDAZOLE (METROGEL) 0.75 % gel Apply 1 application topically 2 (two) times a day Apply twice daily.       multivitamin therapeutic tablet Take 1 tablet by mouth daily.       acetaminophen (TYLENOL) 500 MG tablet Take 1-2 tablets (500-1,000 mg total) by mouth every 4 (four) hours as needed.  0     ascorbic acid, vitamin C, (ASCORBIC ACID WITH MARIA L HIPS) 500 MG tablet Take 500 mg by mouth daily.       aspirin 81 mg chewable tablet Chew 81 mg daily.       No current facility-administered medications for this visit.         Physical Exam   General Appearance:   Well-appearing middle-aged male    /70 (Patient Site: Left Arm, Patient Position: Sitting, Cuff Size: Adult Large)   Pulse 66   Ht 5' 9\" (1.753 m)   Wt 206 lb (93.4 kg)   SpO2 98%   BMI 30.42 kg/m        Respiratory: Normal respiratory effort.  Lungs are clear but diminished breath sounds persist at right base  Heart: Regular rate and rhythm without murmurs, rubs, or gallops.            Additional Information   Social History     Tobacco Use     Smoking status: Former Smoker     Packs/day: 1.00     Years: 35.00     Pack years: 35.00     Types: Cigarettes     Last attempt to quit: 2018     Years since quittin.0     Smokeless tobacco: Never Used     Tobacco comment: ONE PACK PER WEEK   Substance Use Topics     Alcohol use: Yes     Comment: 1-2/day     Drug use: No              Mckinley Gibson MD  "

## 2021-06-22 NOTE — TELEPHONE ENCOUNTER
Chest x-ray showing persistent and slightly larger right lower lobe opacities with pleural effusion.  Discussed with Charly.  He is asymptomatic.  Obtain CT of chest

## 2021-06-22 NOTE — PROGRESS NOTES
Hospital discharge follow up call to pt, no answer.  LVM that RN will try back another time.       Giovanna Stock RN Care Manager, Population Health

## 2021-06-23 NOTE — TELEPHONE ENCOUNTER
I have sent 2 messages to Dr. Owen but have not heard back.  Could our schedulers contact the pulmonary clinic and see if he or anyone else can see him sooner than January 30 regarding persistent pleural effusion.

## 2021-06-23 NOTE — PATIENT INSTRUCTIONS - [facility]
1) For reflux aspiration:  - avoid trigger foods (spicy foods, alcohol & caffeine)  - avoid eating or drinking within 3 hours of bedtime  - start famotidine twice daily  - elevate head of bed with wedge/reflux pillow or blocks under front 2 legs    2) Right ultrasound thoracentesis    3) Repeat chest CT after thoracentesis    4) Clinic follow-up in 2 weeks with Pulmonary Function tests

## 2021-06-23 NOTE — TELEPHONE ENCOUNTER
Called [facility], patient is scheduled to see Dr. Cassidy on Thurs 1/24/19 10am.     Pt notified of appointment.

## 2021-06-23 NOTE — TELEPHONE ENCOUNTER
I spoke with Charly regarding CT scan showing residual pleural effusion but significantly smaller although air bubbles remain.  He remains asymptomatic.  Will discuss with pulmonary.

## 2021-06-23 NOTE — PROGRESS NOTES
Assessment and Plan:  58 year old current smoker with most pertinent history of HTN, who was admitted 12/12-28/2018 w/ pleurisy and dyspnea secondary to likely pneumonia associated w/ a complicated R-sided parapneumonic effusion s/p right pleural pigtail catheter placement.  He states that his shortness of breath has greatly improved since his hospitalization, however is not quite back to his baseline.  He still gets shortness of breath with exertion and cold air.  He recently had an x-ray followed by a a CT scan ordered by his primary care physician which shows a small amount of recurring fluid in the posterior portion of the right thoracic cavity.  Pleural fluid cultures and cytology during his hospitalization were negative.  Sputum culture grew usual martinez, suggesting either contamination with saliva or an aspiration etiology of his pneumonia.  The patient reports a history of heartburn, particularly with spicy foods.  He sleeps flat at night, and often wakes up with sweating and chills.      I suspect that the initial cause of his pneumonia prompting admission in December was nocturnal reflux aspiration, which is continuing to occur.  It is unlikely that the fluid was not completely drained due to lack of evidence of residual fluid on his last chest x-ray during hospitalization.  He was a smoker until this last hospitalization (having successfully quit since then) and does report a family history of lmphoma, hence a malignant effusion is possible, however less likely.  He did have a right paratracheal lymph node thought to be reactive, seen on chest CT scan during hospitalization and follow-up that was unchanged.      1) For reflux aspiration:  - avoid trigger foods (spicy foods, alcohol & caffeine)  - avoid eating or drinking within 3 hours of bedtime  - start famotidine twice daily  - elevate head of bed with wedge/reflux pillow or blocks under front 2 legs    2) Right ultrasound thoracentesis, labs  ordered    3) Repeat chest CT after thoracentesis to evaluate completeness of fluid evacuation    4) Clinic follow-up in 2 weeks with Pulmonary Function tests.  Will consider surgical referral for VATS at that time if significant pleural fluid remain    5) Patient congratulated on quitting smoking since his discharged and was encouraged to continue.    2/14 ADDENDUM: CT showing persistent loculated effusion. Thoracentesis NGTD, without evidence of lymphoma/other malignancy. Ambulatory referral to thoracic surgeon Dr Marin ordered.    CCx: Recurrent right pleural effusion    HPI: Mr Osborne states that his shortness of breath has greatly improved since his hospitalization, however is not quite back to his baseline.  He still gets shortness of breath with exertion and cold air. He reports a history of heartburn, particularly with spicy foods.  He sleeps flat at night, and often wakes up with sweating and chills.     ROS:  A review of 12 organ systems was performed with pertinent positives and negatives noted in the HPI.      Current Meds:  Current Outpatient Medications   Medication Sig Note     acetaminophen (TYLENOL) 500 MG tablet Take 1-2 tablets (500-1,000 mg total) by mouth every 4 (four) hours as needed.      ascorbic acid, vitamin C, (ASCORBIC ACID WITH MARIA L HIPS) 500 MG tablet Take 500 mg by mouth daily. 3/20/2018: Take as needed during flu season     blood glucose meter (ONETOUCH ULTRAMINI) Use 1 each As Directed daily Dispense glucometer brand per patient's insurance at pharmacy discretion..      blood glucose test strips Use 1 each As Directed daily Dispense brand per patient's insurance at pharmacy discretion..      gemfibrozil (LOPID) 600 MG tablet TAKE 1 TABLET(600 MG) BY MOUTH TWICE DAILY      generic lancets (ACCU-CHEK SOFTCLIX LANCETS) Test once daily Dispense brand per patient's insurance at pharmacy discretion..      lisinopril-hydrochlorothiazide (PRINZIDE,ZESTORETIC) 20-12.5 mg per tablet TAKE 1  TABLET BY MOUTH DAILY      metroNIDAZOLE (METROGEL) 0.75 % gel Apply 1 application topically 2 (two) times a day Apply twice daily.      multivitamin therapeutic tablet Take 1 tablet by mouth daily.      aspirin 81 mg chewable tablet Chew 81 mg daily.      famotidine (PEPCID) 20 MG tablet Take 1 tablet (20 mg total) by mouth 2 (two) times a day.        Labs:  No results found for this or any previous visit (from the past 72 hour(s)).    I have personally reviewed all pertinent imaging studies and PFT results unless otherwise noted.    Imaging studies:  Ct Chest With Contrast    Result Date: 1/15/2019  [facility] RADIOLOGY EXAM: CT CHEST W CONTRAST LOCATION: [facility] DATE/TIME: 1/15/2019 8:53 AM INDICATION: Persistent infiltrates on chest x-ray with pleural effusion COMPARISON: 12/14/2018 TECHNIQUE: Helical images were obtained through the chest during injection of IV contrast. Multiplanar reformats were obtained. Dose reduction techniques were used. IV CONTRAST: Iohexol (Omni) 100 mL FINDINGS: LUNGS AND PLEURA: Interval right chest tube drainage with substantial decrease size of the posterior loculated pleural collection but fluid and air bubbles remain; the collection has maximal dimensions of 6.5 x 2.5 cm on axial images and 9 cm sagittal images ((this compares to 16.5 x 10.5 x 7 cm on the prior study. Modestly thickened enhancing pleura surrounds the residual collection. On prior study there were multiple additional small loculated collections on all of these have resolved. Right lung much better aerated but there remains adjacent developing rounded atelectasis at right base. Left lung is clear. MEDIASTINUM: Reactive right paratracheal lymph node near the thoracic inlet unchanged. LIMITED UPPER ABDOMEN: Mild diffuse fatty infiltration of liver. Stable left renal cyst. MUSCULOSKELETAL: Negative.     CONCLUSION: 1.  Significant improvement after drainage of complex pleural collection though a small  "complex collection of air and fluid persist within posterior inferior right pleural space. There is associated adjacent atelectasis or fibrosis.        Physical Exam:  /80   Pulse 64   Resp 12   Ht 5' 9\" (1.753 m)   Wt 211 lb (95.7 kg)   SpO2 98%   BMI 31.16 kg/m    General - Well nourished  Ears/Mouth -  OP pink moist, no thrush  Neck - no cervical lymphadenopathy  Lungs - Clear to ausculation bilaterally.  Mild decreased breath sounds on the right.  CVS - regular rhythm with no murmurs, rubs or gallups  Abdomen - soft, NT, ND, NABS  Ext - no cyanosis or clubbing, trace edema  Skin - no rash  Psychology - alert and oriented, answers appropriate        Electronically signed by:    Fabian Cassidy MD  [facility] Pulmonary and Critical Care Medicine  "

## 2021-06-23 NOTE — TELEPHONE ENCOUNTER
Please advise if you have spoken with pulmonary and what I can inform patient. Thank you.    Adela Verma, CMA

## 2021-06-24 NOTE — PROGRESS NOTES
RESPIRATORY CARE NOTE     Patient Name: Emile Osborne  Today's Date: 2/18/2019     Complete PFT done. Pt performed tests with good effort, 2.5 mg Albuterol given. Test results meet ATS criteria. Results scanned into epic. Pt left in no distress.       DANIA JjT

## 2021-06-24 NOTE — PROGRESS NOTES
Assessment and Plan:  59 year old recent smoker (quit 12/11/2018) with most pertinent history of HTN, who was admitted 12/12-28/2018 w/ pleurisy and dyspnea secondary to likely pneumonia associated w/ a complicated R-sided parapneumonic effusion s/p right pleural pigtail catheter placement.  He states that his shortness of breath has greatly improved since his hospitalization, however is not quite back to his baseline.  He still gets shortness of breath with exertion and cold air.  He recently had an x-ray followed by a a CT scan ordered by his primary care physician which shows a small amount of recurring fluid in the posterior portion of the right thoracic cavity.  Pleural fluid cultures and cytology during his hospitalization were negative.  Sputum culture grew usual martinez, suggesting either contamination with saliva or an aspiration etiology of his pneumonia.  The patient reports a history of a hiatal hernia and heartburn, particularly with spicy foods.  He sleeps flat at night, and often wakes up with sweating and chills.       I suspect that the initial cause of his pneumonia prompting admission in December was nocturnal reflux aspiration, which is continuing to occur.  It is unlikely that the fluid was not completely drained due to lack of evidence of residual fluid on his last chest x-ray during hospitalization.  He was a smoker until this last hospitalization (having successfully quit since then) and does report a family history of lmphoma, hence a malignant effusion is possible, however less likely.  He did have a right paratracheal lymph node thought to be reactive, seen on chest CT scan during hospitalization and follow-up that was unchanged.    On 2/19/2019 follow-up visit, the patient states that he has been following the reflux aspiration precautions and that his morning cough has improved somewhat.  He does still occasionally cough up thick large mucous plugs.  His wife has also totaled him that he  is no longer snoring.  He continues to abstain from smoking    1/24/2019 outpatient right-sided ultrasound thoracentesis: Only 4 mL's of purulent bloody fluid could be removed.  Culture results are negative thus far, cytology did not show evidence of malignancy     1/24/2019 chest CT (performed immediately after thoracentesis): Slightly decreased but persistent loculated right pleural effusion with adjacent inflammation and bronchiectasis in the right basilar airways     2/18/2019 PFTs: Consistent with mild restrictive disease, likely at least partially secondary to compression from loculated right pleural effusion.  Study did not meet criteria for obstruction, and diffusing capacity was normal    1) continue precautions for reflux aspiration (started 1/2019):  - avoid trigger foods (spicy foods, alcohol & caffeine)  - avoid eating or drinking within 3 hours of bedtime  - start famotidine twice daily  - elevate head of bed with wedge/reflux pillow or blocks under front 2 legs     2) follow-up with thoracic surgeon Dr. Marin for consideration of VATS decortication of loculated effusion     3) flutter valve provided in clinic with teaching today to use 2-3 times daily to aid with airway clearance of bronchiectasis in right lower lobe gravity dependent distribution, likely secondary to chronic nocturnal reflux aspiration     4) patient is receiving annual CT screening for lung cancer through his primary care provider Dr. Meza,, may continue this with him     5) Patient congratulated on continuing his smoking abstinence since December 2018.  Approximately 3 minutes were spent on smoking cessation counseling including reviewing the risks of smoking     6) follow-up with myself in 3 months with repeat chest x-ray and nursing spirometry to evaluate for change in loculated effusion and restriction           CCx: peristent pleural effusion    HPI: Mr Osborne states that he has been following the reflux aspiration  precautions and that his morning cough has improved somewhat.  He does still occasionally cough up thick large mucous plugs.  His wife has also totaled him that he is no longer snoring.  He continues to abstain from smoking.  His breathing is not significantly changed from last visit, he still does not feel back to his previous baseline before his pneumonia.    ROS:  A review of 12 organ systems was performed with pertinent positives and negatives noted in the HPI.      Current Meds:  Current Outpatient Medications   Medication Sig Note     acetaminophen (TYLENOL) 500 MG tablet Take 1-2 tablets (500-1,000 mg total) by mouth every 4 (four) hours as needed.      ascorbic acid, vitamin C, (ASCORBIC ACID WITH MARIA L HIPS) 500 MG tablet Take 500 mg by mouth daily. 3/20/2018: Take as needed during flu season     aspirin 81 mg chewable tablet Chew 81 mg daily.      blood glucose meter (ONETOUCH ULTRAMINI) Use 1 each As Directed daily Dispense glucometer brand per patient's insurance at pharmacy discretion..      blood glucose test strips Use 1 each As Directed daily Dispense brand per patient's insurance at pharmacy discretion..      famotidine (PEPCID) 20 MG tablet Take 1 tablet (20 mg total) by mouth 2 (two) times a day.      gemfibrozil (LOPID) 600 MG tablet TAKE 1 TABLET(600 MG) BY MOUTH TWICE DAILY      generic lancets (ACCU-CHEK SOFTCLIX LANCETS) Test once daily Dispense brand per patient's insurance at pharmacy discretion..      lisinopril-hydrochlorothiazide (PRINZIDE,ZESTORETIC) 20-12.5 mg per tablet TAKE 1 TABLET BY MOUTH DAILY      metroNIDAZOLE (METROGEL) 0.75 % gel Apply 1 application topically 2 (two) times a day Apply twice daily.      multivitamin therapeutic tablet Take 1 tablet by mouth daily.        Labs:  Recent Results (from the past 72 hour(s))   Hemoglobin   Result Value Ref Range    Hemoglobin 14.4 14.0 - 18.0 g/dL       I have personally reviewed all pertinent imaging studies and PFT results unless  otherwise noted.    Imaging studies:  No results found.      Physical Exam:  /82   Pulse (!) 56   Resp 24   Wt 214 lb 3.2 oz (97.2 kg)   SpO2 99% Comment: RA  BMI 31.63 kg/m    General - Well nourished  Ears/Mouth -  OP pink moist, no thrush  Neck - no cervical lymphadenopathy  Lungs - Clear to ausculation bilaterally, no wheezes  CVS - regular rhythm with no murmurs, rubs or gallups  Abdomen - soft, NT, ND, NABS  Ext - no cyanosis, clubbing or edema  Skin - no rash  Psychology - alert and oriented, answers appropriate        Electronically signed by:    Fabian Cassidy MD  [facility] Pulmonary and Critical Care Medicine

## 2021-06-27 ENCOUNTER — HEALTH MAINTENANCE LETTER (OUTPATIENT)
Age: 61
End: 2021-06-27

## 2021-06-27 NOTE — PROGRESS NOTES
Progress Notes by Dc Marin MD at 4/25/2019  1:30 PM     Author: Dc Marin MD Service: -- Author Type: Physician    Filed: 4/25/2019  2:18 PM Encounter Date: 4/25/2019 Status: Signed    : Dc Marin MD (Physician)       THORACIC SURGERY OFFICE ESTABLISHED PATIENT VISIT       Dear Dr. Gibson,     I saw Mr. Osborne in follow-up for the evaluation and treatment of a small chronic loculated RIGHT pleural effusion.      HPI  Mr. Emile Osborne is a 59 y.o. year-old male with a residual small parapneumonic effusion after an admission for pneumonia on 12/12/2018. His exercise tolerance is almost at baseline, and he continues to cough up some mucous plugs on a daily basis, but he feels significantly better than 2 months ago. Still not smoking.     Tests   PFT (2/18/2019): FEV1  67% (no change with BD), DLCO 86%  CT chest (4/25/2019): significant improvement    CT chest (2/4/2019): smaller than before       Thoracentesis (small amount of fluid, 2/4/2019): all cultures, stains, and cytology negative; thick fluid, many PMN     CT chest (12/14/2018):           PMH          Past Medical History:   Diagnosis Date   ? Gross hematuria 2003     hematospermia/gross hematuria Normal cystoscopy, normal CT abd discontinued aspirin   ? Headache, post-traumatic 2010     CT negative after fall   ? History of gout 01/17/2017     Gout exacerbation 2012   ? HTN (hypertension)     ? Hyperlipidemia     ? Lipoma of torso 1/17/2017     5cm Jan 2017   ? Pleural effusion associated with pulmonary infection       Hospitalized with community-acquired pneumonia and parapneumonic effusion treated with chest tube and IV antibiotics   ? Rosacea 1/17/2017   ? Tobacco abuse 01/17/2017     Low-dose CT chest negative for pulmonary nodules January 2017   ? Type 2 diabetes mellitus without complication (H)                 Past Surgical History:   Procedure Laterality Date   ? CATARACT EXTRACTION   2014   ? COLONOSCOPY          Normal colonoscopy 2003, has not had repeated since turning age 50 despite recommendation   ? IR PLEURAL DRAINAGE W CATHETER INSERTION   12/14/2018   ? KNEE ARTHROSCOPY         X 2   ? POSTERIOR LAMINECTOMY / DECOMPRESSION CERVICAL SPINE   2002   ? US THORACENTESIS   12/13/2018   ? US THORACENTESIS   2/4/2019   ? VARICOSE VEIN SURGERY             ETOH 2 beers/day  TOB 35 pack years, quit 12/12/2018     Physical examination  BMI 31  Non-contributory     From a personal perspective, he is here with his wife, Kaci. He just had to permanently transfer his mother to a NH after a fall, and it has taken a toll on him.        IMPRESSION   1. Pleural effusion  CT Chest Without Contrast      59 y.o. year-old male with a small chronic loculated RIGHT pleural effusion     PLAN  I spent a total of 10 minutes with Mr. Osborne and Kaci, more than 50% of which were spent in counseling, coordination of care, and face-to-face time. I reviewed the plan as follows:     1) Follow-up in 3 months with a repeat chest CT - after that long-term follow-up with Lc Cassidy and Paige     2) Exercise     3) I recommended weight loss if possible     They had all their questions answered and were in agreement with the plan.  I appreciate the opportunity to participate in the care of your patient and will keep you updated.     Sincerely,

## 2021-06-27 NOTE — PROGRESS NOTES
Progress Notes by Dc Marin MD at 2/28/2019  9:00 AM     Author: Dc Marin MD Service: -- Author Type: Physician    Filed: 2/28/2019 10:06 AM Encounter Date: 2/28/2019 Status: Signed    : Dc Marin MD (Physician)       THORACIC SURGERY OFFICE NEW PATIENT VISIT      Dear Dr. Gibson,    I saw Mr. Osborne at Dr. Thrasher request in consultation for the evaluation and treatment of a small chronic loculated RIGHT pleural effusion.     HPI  Mr. Emile Osborne is a 59 y.o. year-old male with a residual small parapneumonic effusion after an admission for pneumonia on 12/12/2018. His exercise tolerance is almost at baseline, and he continues to cough up some mucous plugs on a daily basis.     Tests   PFT (2/18/2019): FEV1  67% (no change with BD), DLCO 86%  CT chest (2/4/2019): smaller than before      Thoracentesis (small amount of fluid, 2/4/2019): all cultures, stains, and cytology negative; thick fluid, many PMN    CT chest (12/14/2018):         PMH    Past Medical History:   Diagnosis Date   ? Gross hematuria 2003    hematospermia/gross hematuria Normal cystoscopy, normal CT abd discontinued aspirin   ? Headache, post-traumatic 2010    CT negative after fall   ? History of gout 01/17/2017    Gout exacerbation 2012   ? HTN (hypertension)    ? Hyperlipidemia    ? Lipoma of torso 1/17/2017    5cm Jan 2017   ? Pleural effusion associated with pulmonary infection     Hospitalized with community-acquired pneumonia and parapneumonic effusion treated with chest tube and IV antibiotics   ? Rosacea 1/17/2017   ? Tobacco abuse 01/17/2017    Low-dose CT chest negative for pulmonary nodules January 2017   ? Type 2 diabetes mellitus without complication (H)         Past Surgical History:   Procedure Laterality Date   ? CATARACT EXTRACTION  2014   ? COLONOSCOPY      Normal colonoscopy 2003, has not had repeated since turning age 50 despite recommendation   ? IR PLEURAL DRAINAGE W CATHETER INSERTION   12/14/2018   ? KNEE ARTHROSCOPY      X 2   ? POSTERIOR LAMINECTOMY / DECOMPRESSION CERVICAL SPINE  2002   ? US THORACENTESIS  12/13/2018   ? US THORACENTESIS  2/4/2019   ? VARICOSE VEIN SURGERY         ETOH 2 beers/day  TOB 35 pack years, quit 12/12/2018    Physical examination  BMI 31  Non-contributory    From a personal perspective, he is here with his wife, Kaci. He just had to permanently transfer his mother to a NH after a fall, and it has taken a toll on him.      IMPRESSION   1. Pleural effusion  CT Chest Without Contrast      59 y.o. year-old male with a small chronic loculated RIGHT pleural effusion    PLAN  I spent a total of 30 minutes with Mr. Osborne and Kaci, more than 50% of which were spent in counseling, coordination of care, and face-to-face time. I reviewed the plan as follows:    1) Follow-up in 2 month with a repeat chest CT     2) Exercise    3) I recommended weight loss if possible    They had all their questions answered and were in agreement with the plan.  I appreciate the opportunity to participate in the care of your patient and will keep you updated.    Sincerely,

## 2021-06-27 NOTE — PROGRESS NOTES
Progress Notes by Dc Marin MD at 7/18/2019  9:30 AM     Author: Dc Marin MD Service: -- Author Type: Physician    Filed: 7/18/2019 10:09 AM Encounter Date: 7/18/2019 Status: Signed    : Dc Marin MD (Physician)       THORACIC SURGERY OFFICE ESTABLISHED PATIENT VISIT       Dear Dr. Gibson,     I saw Mr. Osborne in follow-up for the evaluation and treatment of a small chronic loculated RIGHT pleural effusion.      HPI  Mr. Emile Osborne is a 59 y.o. year-old male with a residual small parapneumonic effusion after an admission for pneumonia on 12/12/2018. His exercise tolerance is almost at baseline, and he continues to cough up some mucous plugs in the mornings. Overall he is much improved and pleased with his progress.        Tests   PFT (2/18/2019): FEV1  67% (no change with BD), DLCO 86%  CT chest (7/18/2019): almost complete resolution, some rounded atelectasis      CT chest (4/25/2019): significant improvement    CT chest (2/4/2019): smaller than before       Thoracentesis (small amount of fluid, 2/4/2019): all cultures, stains, and cytology negative; thick fluid, many PMN     CT chest (12/14/2018):           PMH             Past Medical History:   Diagnosis Date   ? Gross hematuria 2003     hematospermia/gross hematuria Normal cystoscopy, normal CT abd discontinued aspirin   ? Headache, post-traumatic 2010     CT negative after fall   ? History of gout 01/17/2017     Gout exacerbation 2012   ? HTN (hypertension)     ? Hyperlipidemia     ? Lipoma of torso 1/17/2017     5cm Jan 2017   ? Pleural effusion associated with pulmonary infection       Hospitalized with community-acquired pneumonia and parapneumonic effusion treated with chest tube and IV antibiotics   ? Rosacea 1/17/2017   ? Tobacco abuse 01/17/2017     Low-dose CT chest negative for pulmonary nodules January 2017   ? Type 2 diabetes mellitus without complication (H)                     Past Surgical History:    Procedure Laterality Date   ? CATARACT EXTRACTION   2014   ? COLONOSCOPY         Normal colonoscopy 2003, has not had repeated since turning age 50 despite recommendation   ? IR PLEURAL DRAINAGE W CATHETER INSERTION   12/14/2018   ? KNEE ARTHROSCOPY         X 2   ? POSTERIOR LAMINECTOMY / DECOMPRESSION CERVICAL SPINE   2002   ? US THORACENTESIS   12/13/2018   ? US THORACENTESIS   2/4/2019   ? VARICOSE VEIN SURGERY             ETOH 2 beers/day  TOB 35 pack years, quit 12/12/2018     Physical examination  BMI 31  Non-contributory     From a personal perspective, he is here with his wife, Kaci. He just had to permanently transfer his mother to a NH after a fall, and it has taken a toll on him.        IMPRESSION   1. Pleural effusion  CT Chest Without Contrast      59 y.o. year-old male with a nearly completed resolved small chronic loculated RIGHT pleural effusion     PLAN  I spent a total of 10 minutes with Mr. Love, more than 50% of which were spent in counseling, coordination of care, and face-to-face time. I reviewed the plan as follows:     Follow-up prn with us  Routine health maintenance with PCP     They had all their questions answered and were in agreement with the plan.  I appreciate the opportunity to participate in the care of your patient and will keep you updated.     Sincerely,

## 2021-06-30 NOTE — PROGRESS NOTES
Progress Notes by Mckinley Gibson MD at 12/11/2020  8:40 AM     Author: Mckinley Gibson MD Service: -- Author Type: Physician    Filed: 12/11/2020  6:21 PM Encounter Date: 12/11/2020 Status: Signed    : Mckinley Gibson MD (Physician)       MALE PREVENTATIVE EXAM    Assessment and Plan:     Patient has been advised of split billing requirements and indicates understanding: Yes    1. Routine general medical examination at a health care facility  Immunizations are reviewed and providing flu shot.  Recommending Shingrix.  Consider Pneumovax 23.  Living will has been discussed.  Discussed smoking cessation.  Discussed using alcohol in moderation.  Regular exercise discussed.  Recommending Cologuard for colon cancer screening as alternative to colonoscopy as he is reluctant to have this done.  Prostate exam for prostate cancer screening is normal and PSA was checked earlier this year and was normal.  Recommending that he sees his ophthalmologist every year. Skin exam performed and recommending regular use of sunblock.  Hepatitis C antibody for screening was normal.      2. Type 2 diabetes mellitus without complication, without long-term current use of insulin (H)  Diabetes remains diet controlled.  Checking blood sugar occasionally and usually finding it doing fine.  Recheck A1c.  Encouraging him to get annual diabetic eye exam completed.  He does take a statin daily.  Microalbumin checked earlier this year.  Diabetic foot exam completed.  - Glycosylated Hemoglobin A1c    3. Essential hypertension  Blood pressure is not at goal.  Will increase dose of lisinopril/HCTZ 2 tablets daily.  Discussed sodium restriction, more regular exercise and weight loss.  - Comprehensive Metabolic Panel  - Hemoglobin  - lisinopriL-hydrochlorothiazide (PRINZIDE,ZESTORETIC) 20-12.5 mg per tablet; Take 2 tablets by mouth daily.  Dispense: 180 tablet; Refill: 3  - Urinalysis-UC if Indicated    4. Mixed  hyperlipidemia  Restarted gemfibrozil earlier this year as triglycerides were severely elevated despite reasonably good diabetes control.  Taking this along with atorvastatin.  Recheck lipid profile  - Lipid Cascade FASTING    5. Erectile dysfunction, unspecified erectile dysfunction type  He has noticed more erectile dysfunction over the last year.  We discussed Viagra and how to take correctly and potential side effects.  He is interested in trying.  We discussed contraindication with sublingual nitroglycerin.  - sildenafiL (VIAGRA) 100 MG tablet; Take 0.5-1 tablets ( mg total) by mouth daily as needed for erectile dysfunction.  Dispense: 6 tablet; Refill: 11    6. Tobacco abuse  Unfortunately has restarted smoking this year after losing his mom.  Urging him to quit again and he is motivated.    7. Rosacea  Using MetroGel as needed    8. History of gout    - Uric Acid    9. Screen for colon cancer    - Cologuard; Future    10. Need for immunization against influenza    - Influenza, Recombinant, Inj, Quadrivalent, PF, 18+YRS    11. Encounter for therapeutic drug monitoring  Monitor electrolytes while on diuretic  - Magnesium    Over 25 minutes was spent addressing these chronic and new medical problems beyond time spent performing annual physical with over 50% of the time spent counseling and coordination of care discussing hypertension management, type 2 diabetes, erectile dysfunction, chronic tobacco use, rosacea, and lipid management      Next follow up:  Return in 6 months (on 6/11/2021) for Recheck.    Immunization Review  Adult Imm Review: Providing flu shot and recommending Shingrix        I discussed the following with the patient:   Adult Healthy Living: Importance of regular exercise  Healthy nutrition  Smoking cessation        Subjective:   Chief Complaint: Emile Osborne is an 60 y.o. male here for a preventative health visit.    Patient has been advised of split billing requirements and  "indicates understanding: Yes  HPI: In addition to his annual preventive visit, multiple chronic medical problems and new concerns discussed.    Diabetes remains controlled without medications.  He will check his blood sugar occasionally and usually will find his morning sugar under 120.  He needs his annual diabetic eye exam completed.  No peripheral neuropathy symptoms.    He has experienced increasing erectile dysfunction over the last year.  Libido remains normal.    Unfortunately resumed smoking after the death of his mother.  Denies any chronic cough or dyspnea.    Triglycerides were severely elevated earlier this year and he restarted gemfibrozil taking this along with atorvastatin.  Tolerating without side effects.    Healthy Habits  Are you taking a daily aspirin? No  Do you typically exercising at least 40 min, 3-4 times per week?  Yes  Do you usually eat at least 4 servings of fruit and vegetables a day, include whole grains and fiber and avoid regularly eating high fat foods? Yes  Have you had an eye exam in the past two years? NO  Do you see a dentist twice per year? Yes  Do you have any concerns regarding sleep? No    Safety Screen    Do you feel you are safe where you are living?: Yes (12/11/2020  8:36 AM)  Do you feel you are safe in your relationship(s)?: Yes (12/11/2020  8:36 AM)      Review of Systems:  Please see above.  The rest of the review of systems are negative for all systems.     Cancer Screening     Patient has no health maintenance due at this time              History     Reviewed By Date/Time Sections Reviewed    Mckinley Gibson MD 12/11/2020  8:53 AM Medical, Surgical, Tobacco, Alcohol, Drug Use, Sexual Activity, Family, Social Documentation    [facility] [facility] 12/11/2020  8:35 AM Tobacco, Alcohol, Drug Use            Objective:   Vital Signs:   Visit Vitals  /72   Pulse 62   Ht 5' 7\" (1.702 m)   Wt 205 lb (93 kg)   SpO2 99%   BMI 32.11 kg/m           PHYSICAL " EXAM  EYES: Eyelids, conjunctiva, and sclera were normal. Pupils were normal. Cornea, iris, and lens were normal bilaterally.  HEAD, EARS, NOSE, MOUTH, AND THROAT: Head and face were normal.  TMs normal  NECK: Neck appearance was normal. There were no neck masses and the thyroid was not enlarged and no nodules are felt.  No lymphadenopathy.  RESPIRATORY: Breathing pattern was normal and the chest moved symmetrically.  Percussion/auscultatory percussion was normal.  Lung sounds were normal and there were no rales or wheezes.  CARDIOVASCULAR: Heart rate and rhythm were normal.  S1 and S2 were normal and there were no extra sounds or murmurs. Peripheral pulses in arms and legs were normal.  Jugular venous pressure was normal.  There was no peripheral edema.  No carotid bruits.  GASTROINTESTINAL: The abdomen was normal in contour.  Bowel sounds were present.   Palpation detected no tenderness, mass, or enlarged organs.   RECTAL/PROSTATE: No external lesions.  Sphincter tone normal.  No palpable rectal lesions.  Prostate normal size, smooth, nontender without nodules  MUSCULOSKELETAL: Skeletal configuration was normal and muscle mass was normal for age. Joint appearance was overall normal.  LYMPHATIC: There were no enlarged nodes.  SKIN/HAIR/NAILS: Skin color was normal.  Hair and nails were normal.There were no skin lesions.  NEUROLOGIC: The patient was alert and oriented to person, place, time, and circumstance. Speech was normal. Cranial nerves were normal. Motor strength was normal for age. The patient was normally coordinated.  Sensation intact.  Diabetic foot exam: Good pedal pulses.  Normal vibratory sensation and normal monofilament exam  PSYCHIATRIC:  Mood and affect were normal and the patient had normal recent and remote memory. The patient's judgment and insight were normal.             Medication List          Accurate as of December 11, 2020  6:20 PM. If you have any questions, ask your nurse or doctor.             START taking these medications    sildenafiL 100 MG tablet  Also known as: Viagra  INSTRUCTIONS: Take 0.5-1 tablets ( mg total) by mouth daily as needed for erectile dysfunction.  Started by: Mckinley Gibson MD           CHANGE how you take these medications    lisinopriL-hydrochlorothiazide 20-12.5 mg per tablet  Also known as: PRINZIDE,ZESTORETIC  INSTRUCTIONS: Take 2 tablets by mouth daily.  What changed: how much to take  Changed by: Mckinley Gibson MD           CONTINUE taking these medications    ascorbic acid with michael hips 500 MG tablet  INSTRUCTIONS: Take 500 mg by mouth daily.  Generic drug: ascorbic acid (vitamin C)        atorvastatin 10 MG tablet  Also known as: Lipitor  INSTRUCTIONS: Take 1 tablet (10 mg total) by mouth daily.  Doctor's comments: Replaces gemfibrozil        blood glucose meter  Also known as: OneTouch UltraMini  INSTRUCTIONS: Use 1 each As Directed daily Dispense glucometer brand per patient's insurance at pharmacy discretion..        blood glucose test strips  INSTRUCTIONS: Use 1 each As Directed daily Dispense brand per patient's insurance at pharmacy discretion..        calcium carbonate 600 mg calcium (1,500 mg) tablet  Also known as: OS-LOIDA  INSTRUCTIONS: Take 600 mg by mouth daily.        famotidine 20 MG tablet  Also known as: Pepcid  INSTRUCTIONS: Take 1 tablet (20 mg total) by mouth daily.        Fish Oil 300-1,000 mg capsule  INSTRUCTIONS: Take 1 g by mouth.  Generic drug: fish oil-omega-3 fatty acids        gemfibroziL 600 MG tablet  Also known as: LOPID  INSTRUCTIONS: Take 1 tablet (600 mg total) by mouth daily.        generic lancets  Also known as: Accu-Chek Softclix Lancets  INSTRUCTIONS: Test once daily Dispense brand per patient's insurance at pharmacy discretion..        metroNIDAZOLE 0.75 % gel  Also known as: METROGEL  INSTRUCTIONS: Apply 1 application topically 2 (two) times a day Apply twice daily.  Reason for med: a skin condition on the  cheeks and nose with a reddish rash and acne called acne rosacea        multivitamin therapeutic tablet  INSTRUCTIONS: Take 1 tablet by mouth daily.              Where to Get Your Medications      These medications were sent to [id number] - [place], [place] - [address] AT [facility] [address], [facility] [address]    Phone: [phone]     lisinopriL-hydrochlorothiazide 20-12.5 mg per tablet    sildenafiL 100 MG tablet         Additional Screenings Completed Today:

## 2021-07-03 NOTE — ADDENDUM NOTE
Addendum Note by Ruma Warner CMA at 1/11/2017 10:21 AM     Author: Ruma Warner CMA Service: -- Author Type: Certified Medical Assistant    Filed: 1/11/2017 10:21 AM Encounter Date: 1/3/2017 Status: Signed    : Ruma Warner CMA (Certified Medical Assistant)    Addended by: RUMA WARNER on: 1/11/2017 10:21 AM        Modules accepted: Orders

## 2021-07-03 NOTE — ADDENDUM NOTE
Addendum Note by Juan Cassidy MD at 1/24/2019 10:00 AM     Author: Juan Cassidy MD Service: -- Author Type: Physician    Filed: 2/14/2019  3:21 PM Encounter Date: 1/24/2019 Status: Signed    : Juan Cassidy MD (Physician)    Addended by: HECTOR CASSIDY on: 2/14/2019 03:21 PM        Modules accepted: Orders

## 2021-07-03 NOTE — ADDENDUM NOTE
Addendum Note by Kirsten Kerr LPN at 1/17/2017 10:01 AM     Author: Kirsten Kerr LPN Service: -- Author Type: Licensed Nurse    Filed: 1/17/2017 10:01 AM Encounter Date: 1/17/2017 Status: Signed    : Kirsten Kerr LPN (Licensed Nurse)    Addended by: KIRSTEN KERR on: 1/17/2017 10:01 AM        Modules accepted: Orders

## 2021-07-03 NOTE — ADDENDUM NOTE
Addendum Note by Kirsten Kerr LPN at 1/20/2017  8:52 AM     Author: Kirsten Kerr LPN Service: -- Author Type: Licensed Nurse    Filed: 1/20/2017  8:52 AM Encounter Date: 1/17/2017 Status: Signed    : Kirsten Kerr LPN (Licensed Nurse)    Addended by: KIRSTEN KERR on: 1/20/2017 08:52 AM        Modules accepted: Orders

## 2021-07-03 NOTE — ADDENDUM NOTE
Addendum Note by Juan Cassidy MD at 1/24/2019 10:00 AM     Author: Juan Cassidy MD Service: -- Author Type: Physician    Filed: 2/5/2019  9:37 AM Encounter Date: 1/24/2019 Status: Signed    : Juan Cassidy MD (Physician)    Addended by: HECTOR CASSIDY on: 2/5/2019 09:37 AM        Modules accepted: Orders

## 2021-07-03 NOTE — ADDENDUM NOTE
Addendum Note by Diamond Shukla CMA at 4/3/2018 10:40 AM     Author: Diamond Shukla CMA Service: -- Author Type: Certified Medical Assistant    Filed: 4/3/2018 10:40 AM Encounter Date: 4/3/2018 Status: Signed    : Diamond Shukla CMA (Certified Medical Assistant)    Addended by: DIAMOND SHUKLA on: 4/3/2018 10:40 AM        Modules accepted: Orders

## 2021-07-03 NOTE — ADDENDUM NOTE
Addendum Note by Juan Cassidy MD at 2/19/2019  8:00 AM     Author: Juan Cassidy MD Service: -- Author Type: Physician    Filed: 2/19/2019  8:56 AM Encounter Date: 2/19/2019 Status: Signed    : Juan Cassidy MD (Physician)    Addended by: HECTOR CASSIDY on: 2/19/2019 08:56 AM        Modules accepted: Orders

## 2021-08-18 DIAGNOSIS — E78.5 HYPERLIPIDEMIA: ICD-10-CM

## 2021-08-18 DIAGNOSIS — E78.2 MIXED HYPERLIPIDEMIA: Primary | ICD-10-CM

## 2021-08-18 NOTE — TELEPHONE ENCOUNTER
Refill Request  Medication name: Pending Prescriptions:                       Disp   Refills    gemfibrozil (LOPID) 600 MG tablet         90 tab*3            Sig: Take 1 tablet (600 mg) by mouth daily    Who prescribed the medication: Paige  Last refill on medication: 07/14/21  Requested Pharmacy: [facility]  Last appointment with PCP: 12/11/20  Next appointment: Not due

## 2021-08-19 RX ORDER — GEMFIBROZIL 600 MG/1
600 TABLET, FILM COATED ORAL DAILY
Qty: 90 TABLET | Refills: 3 | Status: SHIPPED | OUTPATIENT
Start: 2021-08-19 | End: 2022-06-03

## 2021-10-17 ENCOUNTER — HEALTH MAINTENANCE LETTER (OUTPATIENT)
Age: 61
End: 2021-10-17

## 2021-12-11 DIAGNOSIS — I10 ESSENTIAL HYPERTENSION: ICD-10-CM

## 2021-12-14 RX ORDER — LISINOPRIL AND HYDROCHLOROTHIAZIDE 12.5; 2 MG/1; MG/1
TABLET ORAL
Qty: 180 TABLET | Refills: 3 | OUTPATIENT
Start: 2021-12-14

## 2021-12-15 DIAGNOSIS — I10 ESSENTIAL HYPERTENSION: ICD-10-CM

## 2021-12-15 RX ORDER — LISINOPRIL AND HYDROCHLOROTHIAZIDE 12.5; 2 MG/1; MG/1
2 TABLET ORAL DAILY
Qty: 180 TABLET | Refills: 0 | Status: SHIPPED | OUTPATIENT
Start: 2021-12-15 | End: 2022-03-15

## 2021-12-15 NOTE — TELEPHONE ENCOUNTER
..Reason for Call:  Medication or medication refill:    Do you use a [facility]?  Name of the pharmacy and phone number for the current request[id number] [place] [address] AT [address]  [phone]    Name of the medication requested: lisinopriL-hydrochlorothiazide (PRINZIDE,ZESTORETIC) 20-12.5 mg per tablet    Other request: NONE     Can we leave a detailed message on this number? YES    Phone number patient can be reached at: Home number on file [phone] (home)    Best Time: any    Call taken on 12/15/2021 at 8:15 AM by DOUGLAS Frank

## 2022-01-04 DIAGNOSIS — N52.9 ERECTILE DYSFUNCTION, UNSPECIFIED ERECTILE DYSFUNCTION TYPE: ICD-10-CM

## 2022-01-04 RX ORDER — SILDENAFIL 100 MG/1
50-100 TABLET, FILM COATED ORAL DAILY PRN
Qty: 6 TABLET | Refills: 11 | OUTPATIENT
Start: 2022-01-04

## 2022-01-04 NOTE — TELEPHONE ENCOUNTER
Refill Request  Medication name: Pending Prescriptions:                       Disp   Refills    sildenafil (VIAGRA) 100 MG tablet         6 tabl*11           Sig: Take 0.5-1 tablets ( mg) by mouth daily as           needed    Who prescribed the medication: Paige  Last refill on medication: 12/11/2020  Requested Pharmacy: [facility]  Last appointment with PCP: 12/11/2020  Next appointment: None

## 2022-01-04 NOTE — TELEPHONE ENCOUNTER
Left message for patient to call back to schedule appointment to continue refills on medications.

## 2022-02-04 ENCOUNTER — TRANSFERRED RECORDS (OUTPATIENT)
Dept: HEALTH INFORMATION MANAGEMENT | Facility: CLINIC | Age: 62
End: 2022-02-04
Payer: COMMERCIAL

## 2022-02-05 ENCOUNTER — HEALTH MAINTENANCE LETTER (OUTPATIENT)
Age: 62
End: 2022-02-05

## 2022-03-04 NOTE — PATIENT INSTRUCTIONS - HE
1) Follow-up Dr Marin of thoracic surgery to discuss possible VATS removal of loculated effusion    2) Use flutter valve 2-3 times daily    3) Continue reflux aspiration precautions     4) Return to see me in 3 months with chest XRay   Advancement-Rotation Flap Text: The defect edges were debeveled with a #15 scalpel blade.  Given the location of the defect, shape of the defect and the proximity to free margins an advancement-rotation flap was deemed most appropriate.  Using a sterile surgical marker, an appropriate flap was drawn incorporating the defect and placing the expected incisions within the relaxed skin tension lines where possible. The area thus outlined was incised deep to adipose tissue with a #15 scalpel blade.  The skin margins were undermined to an appropriate distance in all directions utilizing iris scissors.

## 2022-05-14 DIAGNOSIS — E11.9 TYPE 2 DIABETES MELLITUS WITHOUT COMPLICATION, WITHOUT LONG-TERM CURRENT USE OF INSULIN (H): ICD-10-CM

## 2022-05-16 RX ORDER — ATORVASTATIN CALCIUM 10 MG/1
TABLET, FILM COATED ORAL
Qty: 90 TABLET | Refills: 0 | OUTPATIENT
Start: 2022-05-16

## 2022-05-16 NOTE — TELEPHONE ENCOUNTER
Already told 3 months ago to schedule an appointment which I do not see he has done.  Last seen for over 12 months ago.  Once appointment is scheduled, I will give him a note medication so that he does not run out.  Please call

## 2022-05-16 NOTE — TELEPHONE ENCOUNTER
"Routing refill request to provider for review/approval because:  Labs not current:  LDL  Patient needs to be seen because it has been more than 1 year since last office visit.    Last Written Prescription Date:  2/15/22  Last Fill Quantity: 90,  # refills: 0   Last office visit provider:  12/11/20     Requested Prescriptions   Pending Prescriptions Disp Refills     atorvastatin (LIPITOR) 10 MG tablet [Pharmacy Med Name: ATORVASTATIN 10MG TABLETS] 90 tablet 0     Sig: TAKE 1 TABLET(10 MG) BY MOUTH DAILY. FOLLOW UP APPOINTMENT AS NEEDED FOR FURTHER REFILLS       Statins Protocol Failed - 5/16/2022  8:20 AM        Failed - LDL on file in past 12 months     Recent Labs   Lab Test 12/11/20  0949   LDL 68             Failed - Recent (12 mo) or future (30 days) visit within the authorizing provider's specialty     Patient has had an office visit with the authorizing provider or a provider within the authorizing providers department within the previous 12 mos or has a future within next 30 days. See \"Patient Info\" tab in inbasket, or \"Choose Columns\" in Meds & Orders section of the refill encounter.              Passed - No abnormal creatine kinase in past 12 months     Recent Labs   Lab Test 12/11/20  0949   CKT 93                Passed - Medication is active on med list        Passed - Patient is age 18 or older             Jose Shaw RN 05/16/22 8:20 AM  "

## 2022-05-23 NOTE — TELEPHONE ENCOUNTER
Refill Approved    Rx renewed per Medication Renewal Policy. Medication was last renewed on 6/5/18.    Ov: 1/3/19    Marce Mercado, [facility] Triage/Med Refill 2/24/2019     Requested Prescriptions   Pending Prescriptions Disp Refills     lisinopril-hydrochlorothiazide (PRINZIDE,ZESTORETIC) 20-12.5 mg per tablet [Pharmacy Med Name: LISINOPRIL-HCTZ 20/12.5MG TABLETS] 90 tablet 0     Sig: TAKE 1 TABLET BY MOUTH DAILY    Diuretics/Combination Diuretics Refill Protocol  Passed - 2/22/2019 10:30 AM       Passed - Visit with PCP or prescribing provider visit in past 12 months    Last office visit with prescriber/PCP: 1/3/2019 Mckinley Gibson MD OR same dept: 1/3/2019 Mckinley Gibson MD OR same specialty: 1/3/2019 Mckinley Gibson MD  Last physical: 3/1/2018 Last MTM visit: Visit date not found   Next visit within 3 mo: Visit date not found  Next physical within 3 mo: Visit date not found  Prescriber OR PCP: Mckinley Gibson MD  Last diagnosis associated with med order: 1. Essential hypertension  - lisinopril-hydrochlorothiazide (PRINZIDE,ZESTORETIC) 20-12.5 mg per tablet [Pharmacy Med Name: LISINOPRIL-HCTZ 20/12.5MG TABLETS]; TAKE 1 TABLET BY MOUTH DAILY  Dispense: 90 tablet; Refill: 0    If protocol passes may refill for 12 months if within 3 months of last provider visit (or a total of 15 months).            Passed - Serum Potassium in past 12 months     Lab Results   Component Value Date    Potassium 3.7 12/13/2018            Passed - Serum Sodium in past 12 months     Lab Results   Component Value Date    Sodium 140 12/13/2018            Passed - Blood pressure on file in past 12 months    BP Readings from Last 1 Encounters:   02/19/19 138/82            Passed - Serum Creatinine in past 12 months     Creatinine   Date Value Ref Range Status   12/13/2018 0.92 0.70 - 1.30 mg/dL Final                          09:30

## 2022-05-31 RX ORDER — ATORVASTATIN CALCIUM 10 MG/1
10 TABLET, FILM COATED ORAL DAILY
Qty: 30 TABLET | Refills: 0 | Status: SHIPPED | OUTPATIENT
Start: 2022-05-31 | End: 2022-06-03

## 2022-06-03 ENCOUNTER — OFFICE VISIT (OUTPATIENT)
Dept: INTERNAL MEDICINE | Facility: CLINIC | Age: 62
End: 2022-06-03
Payer: COMMERCIAL

## 2022-06-03 VITALS
OXYGEN SATURATION: 97 % | HEART RATE: 61 BPM | BODY MASS INDEX: 33.63 KG/M2 | DIASTOLIC BLOOD PRESSURE: 71 MMHG | HEIGHT: 67 IN | SYSTOLIC BLOOD PRESSURE: 138 MMHG | WEIGHT: 214.3 LBS

## 2022-06-03 DIAGNOSIS — Z87.891 PERSONAL HISTORY OF TOBACCO USE: ICD-10-CM

## 2022-06-03 DIAGNOSIS — I10 ESSENTIAL HYPERTENSION: ICD-10-CM

## 2022-06-03 DIAGNOSIS — Z12.5 SCREENING FOR PROSTATE CANCER: ICD-10-CM

## 2022-06-03 DIAGNOSIS — Z86.0100 PERSONAL HISTORY OF COLONIC POLYPS: ICD-10-CM

## 2022-06-03 DIAGNOSIS — Z00.00 ROUTINE GENERAL MEDICAL EXAMINATION AT A HEALTH CARE FACILITY: Primary | ICD-10-CM

## 2022-06-03 DIAGNOSIS — N52.9 ERECTILE DYSFUNCTION, UNSPECIFIED ERECTILE DYSFUNCTION TYPE: ICD-10-CM

## 2022-06-03 DIAGNOSIS — Z72.0 TOBACCO ABUSE: ICD-10-CM

## 2022-06-03 DIAGNOSIS — Z87.39 HISTORY OF GOUT: ICD-10-CM

## 2022-06-03 DIAGNOSIS — E11.9 TYPE 2 DIABETES MELLITUS WITHOUT COMPLICATION, WITHOUT LONG-TERM CURRENT USE OF INSULIN (H): ICD-10-CM

## 2022-06-03 DIAGNOSIS — L71.9 ROSACEA: ICD-10-CM

## 2022-06-03 DIAGNOSIS — E78.2 MIXED HYPERLIPIDEMIA: ICD-10-CM

## 2022-06-03 DIAGNOSIS — Z51.81 ENCOUNTER FOR THERAPEUTIC DRUG MONITORING: ICD-10-CM

## 2022-06-03 LAB
ALBUMIN SERPL-MCNC: 4.4 G/DL (ref 3.5–5)
ALP SERPL-CCNC: 75 U/L (ref 45–120)
ALT SERPL W P-5'-P-CCNC: 25 U/L (ref 0–45)
ANION GAP SERPL CALCULATED.3IONS-SCNC: 15 MMOL/L (ref 5–18)
AST SERPL W P-5'-P-CCNC: 23 U/L (ref 0–40)
BILIRUB SERPL-MCNC: 1.4 MG/DL (ref 0–1)
BUN SERPL-MCNC: 10 MG/DL (ref 8–22)
CALCIUM SERPL-MCNC: 9.7 MG/DL (ref 8.5–10.5)
CHLORIDE BLD-SCNC: 96 MMOL/L (ref 98–107)
CHOLEST SERPL-MCNC: 195 MG/DL
CO2 SERPL-SCNC: 22 MMOL/L (ref 22–31)
CREAT SERPL-MCNC: 0.79 MG/DL (ref 0.7–1.3)
CREAT UR-MCNC: 23 MG/DL
ERYTHROCYTE [DISTWIDTH] IN BLOOD BY AUTOMATED COUNT: 12 % (ref 10–15)
FASTING STATUS PATIENT QL REPORTED: ABNORMAL
GFR SERPL CREATININE-BSD FRML MDRD: >90 ML/MIN/1.73M2
GLUCOSE BLD-MCNC: 103 MG/DL (ref 70–125)
HBA1C MFR BLD: 6.2 % (ref 0–5.6)
HCT VFR BLD AUTO: 39 % (ref 40–53)
HDLC SERPL-MCNC: 40 MG/DL
HGB BLD-MCNC: 13.7 G/DL (ref 13.3–17.7)
LDLC SERPL CALC-MCNC: ABNORMAL MG/DL
MAGNESIUM SERPL-MCNC: 1.6 MG/DL (ref 1.8–2.6)
MCH RBC QN AUTO: 30 PG (ref 26.5–33)
MCHC RBC AUTO-ENTMCNC: 35.1 G/DL (ref 31.5–36.5)
MCV RBC AUTO: 85 FL (ref 78–100)
MICROALBUMIN UR-MCNC: 1.71 MG/DL (ref 0–1.99)
MICROALBUMIN/CREAT UR: 74.3 MG/G CR
PLATELET # BLD AUTO: 179 10E3/UL (ref 150–450)
POTASSIUM BLD-SCNC: 3.9 MMOL/L (ref 3.5–5)
PROT SERPL-MCNC: 7.3 G/DL (ref 6–8)
PSA SERPL-MCNC: 0.55 UG/L (ref 0–4.5)
RBC # BLD AUTO: 4.57 10E6/UL (ref 4.4–5.9)
SODIUM SERPL-SCNC: 133 MMOL/L (ref 136–145)
TRIGL SERPL-MCNC: 545 MG/DL
WBC # BLD AUTO: 8.4 10E3/UL (ref 4–11)

## 2022-06-03 PROCEDURE — 85027 COMPLETE CBC AUTOMATED: CPT | Performed by: INTERNAL MEDICINE

## 2022-06-03 PROCEDURE — 83036 HEMOGLOBIN GLYCOSYLATED A1C: CPT | Performed by: INTERNAL MEDICINE

## 2022-06-03 PROCEDURE — 80061 LIPID PANEL: CPT | Performed by: INTERNAL MEDICINE

## 2022-06-03 PROCEDURE — 90750 HZV VACC RECOMBINANT IM: CPT | Performed by: INTERNAL MEDICINE

## 2022-06-03 PROCEDURE — 90471 IMMUNIZATION ADMIN: CPT | Performed by: INTERNAL MEDICINE

## 2022-06-03 PROCEDURE — 99214 OFFICE O/P EST MOD 30 MIN: CPT | Mod: 25 | Performed by: INTERNAL MEDICINE

## 2022-06-03 PROCEDURE — 82043 UR ALBUMIN QUANTITATIVE: CPT | Performed by: INTERNAL MEDICINE

## 2022-06-03 PROCEDURE — 99396 PREV VISIT EST AGE 40-64: CPT | Mod: 25 | Performed by: INTERNAL MEDICINE

## 2022-06-03 PROCEDURE — 83735 ASSAY OF MAGNESIUM: CPT | Performed by: INTERNAL MEDICINE

## 2022-06-03 PROCEDURE — G0103 PSA SCREENING: HCPCS | Performed by: INTERNAL MEDICINE

## 2022-06-03 PROCEDURE — 80053 COMPREHEN METABOLIC PANEL: CPT | Performed by: INTERNAL MEDICINE

## 2022-06-03 PROCEDURE — 36415 COLL VENOUS BLD VENIPUNCTURE: CPT | Performed by: INTERNAL MEDICINE

## 2022-06-03 RX ORDER — GEMFIBROZIL 600 MG/1
600 TABLET, FILM COATED ORAL DAILY
Qty: 90 TABLET | Refills: 3 | Status: SHIPPED | OUTPATIENT
Start: 2022-06-03 | End: 2023-12-04

## 2022-06-03 RX ORDER — ATORVASTATIN CALCIUM 10 MG/1
10 TABLET, FILM COATED ORAL DAILY
Qty: 90 TABLET | Refills: 3 | Status: SHIPPED | OUTPATIENT
Start: 2022-06-03 | End: 2023-07-06

## 2022-06-03 RX ORDER — LISINOPRIL AND HYDROCHLOROTHIAZIDE 12.5; 2 MG/1; MG/1
2 TABLET ORAL DAILY
Qty: 180 TABLET | Refills: 3 | Status: SHIPPED | OUTPATIENT
Start: 2022-06-03 | End: 2023-06-05

## 2022-06-03 RX ORDER — SILDENAFIL 100 MG/1
50-100 TABLET, FILM COATED ORAL DAILY PRN
Qty: 12 TABLET | Refills: 11 | Status: SHIPPED | OUTPATIENT
Start: 2022-06-03 | End: 2023-06-05

## 2022-06-03 ASSESSMENT — ENCOUNTER SYMPTOMS
HEMATOCHEZIA: 0
CHILLS: 0
ABDOMINAL PAIN: 0
HEMATURIA: 0

## 2022-06-03 NOTE — PROGRESS NOTES
Lung Cancer Screening Shared Decision Making Visit     Emile Osborne, a 62 year old male, is eligible for lung cancer screening    History   Smoking Status     Current Some Day Smoker     Packs/day: 1.00     Years: 35.00     Types: Cigarettes     Last attempt to quit: 12/11/2018   Smokeless Tobacco     Never Used     Comment: ONE PACK PER WEEK   {TIP  Follow this link to update the tobacco history if needed :[id number]}    I have discussed with patient the risks and benefits of screening for lung cancer with low-dose CT.     The risks include:    radiation exposure: one low dose chest CT has as much ionizing radiation as about 15 chest x-rays, or 6 months of background radiation living in [place]      false positives: most findings/nodules are NOT cancer, but some might still require additional diagnostic evaluation, including biopsy    over-diagnosis: some slow growing cancers that might never have been clinically significant will be detected and treated unnecessarily     The benefit of early detection of lung cancer is contingent upon adherence to annual screening or more frequent follow up if indicated.     Furthermore, to benefit from screening, Emile must be willing and able to undergo diagnostic procedures, if indicated. Although no specific guide is available for determining severity of comorbidities, it is reasonable to withhold screening in patients who have greater mortality risk from other diseases.     We did discuss that the best way to prevent lung cancer is to not smoke.    Some patients may value a numeric estimation of lung cancer risk when evaluating if lung cancer screening is right for them, here is one calculator:    ShouldIScreen

## 2022-06-03 NOTE — PATIENT INSTRUCTIONS
Preventive Health Recommendations  Male Ages 50 - 64    Yearly exam:             See your health care provider every year in order to  o   Review health changes.   o   Discuss preventive care.    o   Review your medicines if your doctor has prescribed any.   Cholesterol screening    Colonoscopy will be due again January 2024  PSA annually for prostate cancer screening  You should be tested each year for STDs (sexually transmitted diseases), if you re at risk.     Shots: Get a flu shot each year. Get a tetanus shot every 10 years.  I would recommend getting a COVID booster completed this summer.  You should also have a pneumonia vaccine called Prevnar 20.  Make sure you return in 2 to 6 months to have your second Shingrix vaccine completed.    Nutrition:  Eat at least 5 servings of fruits and vegetables daily.   Eat whole-grain bread, whole-wheat pasta and brown rice instead of white grains and rice.   Get adequate Calcium and Vitamin D.     Lifestyle  Exercise for at least 150 minutes a week (30 minutes a day, 5 days a week). This will help you control your weight and prevent disease.   Limit alcohol to 1-2 drink per day.   No smoking.  You need to completely quit again.  Wear sunscreen to prevent skin cancer.  Consider seeing a dermatologist every 1 to 2 years for a total-body skin exam  See your dentist every six months for an exam and cleaning.   See your eye doctor every year.  Make sure you get your appointment scheduled soon.    Lung Cancer Screening   Frequently Asked Questions  If you are at high-risk for lung cancer, getting screened with low-dose computed tomography (LDCT) every year can help save your life. This handout offers answers to some of the most common questions about lung cancer screening. If you have other questions, please call [phone] ([phone]).     What is it?  Lung cancer screening uses special X-ray technology to create an image of your lung tissue. The exam is quick and  easy and takes less than 10 seconds. We don t give you any medicine or use any needles. You can eat before and after the exam. You don t need to change your clothes as long as the clothing on your chest doesn t contain metal. But, you do need to be able to hold your breath for at least 6 seconds during the exam.    What is the goal of lung cancer screening?  The goal of lung cancer screening is to save lives. Many times, lung cancer is not found until a person starts having physical symptoms. Lung cancer screening can help detect lung cancer in the earliest stages when it may be easier to treat.    Who should be screened for lung cancer?  We suggest lung cancer screening for anyone who is at high-risk for lung cancer. You are in the high-risk group if you:     are between the ages of 55 and 79, and   have smoked at least 1 pack of cigarettes a day for 20 or more years, and   still smoke or have quit within the past 15 years.    However, if you have a new cough or shortness of breath, you should talk to your doctor before being screened.    Why does it matter if I have symptoms?  Certain symptoms can be a sign that you have a condition in your lungs that should be checked and treated by your doctor. These symptoms include fever, chest pain, a new or changing cough, shortness of breath that you have never felt before, coughing up blood or unexplained weight loss. Having any of these symptoms can greatly affect the results of lung cancer screening.       Should all smokers get an LDCT lung cancer screening exam?  It depends. Lung cancer screening is for a very specific group of men and women who have a history of heavy smoking over a long period of time (see  Who should be screened for lung cancer  above).  I am in the high-risk group, but have been diagnosed with cancer in the past. Is LDCT lung cancer screening right for me?  In some cases, you should not have LDCT lung screening, such as when your doctor is already  following your cancer with CT scan studies. Your doctor will help you decide if LDCT lung screening is right for you.  Do I need to have a screening exam every year?  Yes. If you are in the high-risk group described earlier, you should get an LDCT lung cancer screening exam every year until you are 79, or are no longer willing or able to undergo screening and possible procedures to diagnose and treat lung cancer.  How effective is LDCT at preventing death from lung cancer?  Studies have shown that LDCT lung cancer screening can lower the risk of death from lung cancer by 20 percent in people who are at high-risk.  What are the risks?  There are some risks and limitations of LDCT lung cancer screening. We want to make sure you understand the risks and benefits, so please let us know if you have any questions. Your doctor may want to talk with you more about these risks.   Radiation exposure: As with any exam that uses radiation, there is a very small increased risk of cancer. The amount of radiation in LDCT is small--about the same amount a person would get from a mammogram. Your doctor orders the exam when he or she feels the potential benefits outweigh the risks.   False negatives: No test is perfect, including LDCT. It is possible that you may have a medical condition, including lung cancer, that is not found during your exam. This is called a false negative result.   False positives and more testing: LDCT very often finds something in the lung that could be cancer, but in fact is not. This is called a false positive result. False positive tests often cause anxiety. To make sure these findings are not cancer, you may need to have more tests. These tests will be done only if you give us permission. Sometimes patients need a treatment that can have side effects, such as a biopsy. For more information on false positives, see  What can I expect from the results?    Findings not related to lung cancer: Your LDCT exam  also takes pictures of areas of your body next to your lungs. In a very small number of cases, the CT scan will show an abnormal finding in one of these areas, such as your kidneys, adrenal glands, liver or thyroid. This finding may not be serious, but you may need more tests. Your doctor can help you decide what other tests you may need, if any.  What can I expect from the results?  About 1 out of 4 LDCT exams will find something that may need more tests. Most of the time, these findings are lung nodules. Lung nodules are very small collections of tissue in the lung. These nodules are very common, and the vast majority--more than 97 percent--are not cancer (benign). Most are normal lymph nodes or small areas of scarring from past infections.  But, if a small lung nodule is found to be cancer, the cancer can be cured more than 90 percent of the time. To know if the nodule is cancer, we may need to get more images before your next yearly screening exam. If the nodule has suspicious features (for example, it is large, has an odd shape or grows over time), we will refer you to a specialist for further testing.  Will my doctor also get the results?  Yes. Your doctor will get a copy of your results.  Is it okay to keep smoking now that there s a cancer screening exam?  No. Tobacco is one of the strongest cancer-causing agents. It causes not only lung cancer, but other cancers and cardiovascular (heart) diseases as well. The damage caused by smoking builds over time. This means that the longer you smoke, the higher your risk of disease. While it is never too late to quit, the sooner you quit, the better.  Where can I find help to quit smoking?  The best way to prevent lung cancer is to stop smoking. If you have already quit smoking, congratulations and keep it up! For help on quitting smoking, please call [employer] at 4-770-QUITNOW (1-400.997.5478) or the American [employer] Society at 1-227.932.7066 to find local  resources near you.  One-on-one health coaching:  If you d prefer to work individually with a health care provider on tobacco cessation, we offer:     Medication Therapy Management:  Our specially trained pharmacists work closely with you and your doctor to help you quit smoking.  Call [phone] or [phone] (toll free).

## 2022-06-03 NOTE — LETTER
June 4, 2022      Charly Osborne  [address]  [facility] [address]        Dear Charly,    Resulted Orders   HEMOGLOBIN A1C   Result Value Ref Range    Hemoglobin A1C 6.2 (H) 0.0 - 5.6 %      Comment:      Normal <5.7%   Prediabetes 5.7-6.4%    Diabetes 6.5% or higher     Note: Adopted from ADA consensus guidelines.   Albumin Random Urine Quantitative with Creat Ratio   Result Value Ref Range    Microalbumin Urine mg/dL 1.71 0.00 - 1.99 mg/dL    Creatinine Urine mg/dL 23 mg/dL    Microalbumin Urine mg/g Cr 74.3 (H) <=19.9 mg/g Cr    Narrative    Microalbumin, Random Urine   <2.0 mg/dL . . . . . . . . Normal   3.0-30.0 mg/dL . . . . . . Microalbuminuria   >30.0 mg/dL . . . . . .  . Clinical Proteinuria     Microalbumin/Creatinine Ratio, Random Urine   <20 mg/g . . . . .. . . . Normal    mg/g . . . . . . . Microalbuminuria   >300 mg/g . . . . . . . . Clinical Proteinuria   CBC with platelets   Result Value Ref Range    WBC Count 8.4 4.0 - 11.0 10e3/uL    RBC Count 4.57 4.40 - 5.90 10e6/uL    Hemoglobin 13.7 13.3 - 17.7 g/dL    Hematocrit 39.0 (L) 40.0 - 53.0 %    MCV 85 78 - 100 fL    MCH 30.0 26.5 - 33.0 pg    MCHC 35.1 31.5 - 36.5 g/dL    RDW 12.0 10.0 - 15.0 %    Platelet Count 179 150 - 450 10e3/uL   Comprehensive metabolic panel (BMP + Alb, Alk Phos, ALT, AST, Total. Bili, TP)   Result Value Ref Range    Sodium 133 (L) 136 - 145 mmol/L    Potassium 3.9 3.5 - 5.0 mmol/L    Chloride 96 (L) 98 - 107 mmol/L    Carbon Dioxide (CO2) 22 22 - 31 mmol/L    Anion Gap 15 5 - 18 mmol/L    Urea Nitrogen 10 8 - 22 mg/dL    Creatinine 0.79 0.70 - 1.30 mg/dL    Calcium 9.7 8.5 - 10.5 mg/dL    Glucose 103 70 - 125 mg/dL    Alkaline Phosphatase 75 45 - 120 U/L    AST 23 0 - 40 U/L    ALT 25 0 - 45 U/L    Protein Total 7.3 6.0 - 8.0 g/dL    Albumin 4.4 3.5 - 5.0 g/dL    Bilirubin Total 1.4 (H) 0.0 - 1.0 mg/dL    GFR Estimate >90 >60 mL/min/1.73m2      Comment:      Effective December 21, 2021 eGFRcr in adults is  calculated using the 2021 CKD-EPI creatinine equation which includes age and gender (Mina et al., NEJ, DOI: 10.1056/DKGMdv2084698)   Lipid Profile (Chol, Trig, HDL, LDL calc)   Result Value Ref Range    Cholesterol 195 <=199 mg/dL    Triglycerides 545 (H) <=149 mg/dL    Direct Measure HDL 40 >=40 mg/dL      Comment:      HDL Cholesterol Reference Range:     0-2 years:   No reference ranges established for patients under 2 years old  at [facility] for lipid analytes.    2-8 years:  Greater than 45 mg/dL     18 years and older:   Female: Greater than or equal to 50 mg/dL   Male:   Greater than or equal to 40 mg/dL    LDL Cholesterol Calculated        Comment:      Cannot estimate LDL when triglyceride exceeds 400 mg/dL    Patient Fasting > 8hrs? Unknown    PSA, screen   Result Value Ref Range    Prostate Specific Antigen Screen 0.55 0.00 - 4.50 ug/L    Narrative    Assay Method is Abbott Prostate-Specific Antigen (PSA)  Standard-WHO 1st International (90:10)   Magnesium   Result Value Ref Range    Magnesium 1.6 (L) 1.8 - 2.6 mg/dL     Diabetes remains well controlled with A1c of 6.2%.  Normal kidney function.  Normal liver studies.  Triglycerides are quite high although the rest your cholesterol looks good.  Regular aerobic exercise, weight loss and cutting back on beer will likely help improve this number.    PSA remains low and reassuring for no prostate cancer.  CBC is okay.  No anemia.  Magnesium level is slightly low and this is related to the use of hydrochlorothiazide as part of your blood pressure medication.  It would be a good idea to start a magnesium supplement such as magnesium oxide 400 mg daily.    Make sure you return in 2 to 6 months to get a second Shingrix vaccine completed.    If you have any questions or concerns, please call the clinic at the number listed above.       Sincerely,      Mckinley Gibson MD

## 2022-06-03 NOTE — PROGRESS NOTES
SUBJECTIVE:   CC: Emile Osborne is an 62 year old male who presents for preventative health visit.     [id number]-old male here for annual preventive visit and to follow-up medical problems including type 2 diabetes, hypertension, hyperlipidemia and history of gout.  See assessment and plan for details.    Patient has been advised of split billing requirements and indicates understanding: Yes  Healthy Habits:     Getting at least 3 servings of Calcium per day:  Yes    Bi-annual eye exam:  NO    Dental care twice a year:  NO    Sleep apnea or symptoms of sleep apnea:  None    Diet:  Regular (no restrictions)    Frequency of exercise:  2-3 days/week    Duration of exercise:  15-30 minutes    Taking medications regularly:  Yes    Medication side effects:  Other    PHQ-2 Total Score: 0    Additional concerns today:  Yes              Today's PHQ-2 Score:   PHQ-2 ( 1999 [employer]) 6/3/2022   Q1: Little interest or pleasure in doing things 0   Q2: Feeling down, depressed or hopeless 0   PHQ-2 Score 0   Q1: Little interest or pleasure in doing things Not at all   Q2: Feeling down, depressed or hopeless Not at all   PHQ-2 Score 0       Abuse: Current or Past(Physical, Sexual or Emotional)- No  Do you feel safe in your environment? Yes    Have you ever done Advance Care Planning? (For example, a Health Directive, POLST, or a discussion with a medical provider or your loved ones about your wishes): No, advance care planning information given to patient to review.  Patient plans to discuss their wishes with loved ones or provider.      Social History     Tobacco Use     Smoking status: Current Some Day Smoker     Packs/day: 1.00     Years: 35.00     Pack years: 35.00     Types: Cigarettes     Last attempt to quit: 12/11/2018     Years since quitting: 3.4     Smokeless tobacco: Never Used     Tobacco comment: ONE PACK PER WEEK   Substance Use Topics     Alcohol use: Yes     Comment: Alcoholic Drinks/day: 3/day     If you drink  alcohol do you typically have >3 drinks per day or >7 drinks per week? No    Alcohol Use 6/3/2022   Prescreen: >3 drinks/day or >7 drinks/week? No   Prescreen: >3 drinks/day or >7 drinks/week? -       Last PSA:   Prostate Specific Antigen Screen   Date Value Ref Range Status   07/22/2020 0.4 0.0 - 4.5 ng/mL Final       Reviewed orders with patient. Reviewed health maintenance and updated orders accordingly - Yes  Lab work is in process    Reviewed and updated as needed this visit by clinical staff   Tobacco  Allergies  Meds  Problems  Med Hx  Surg Hx  Fam Hx            Reviewed and updated as needed this visit by Provider   Tobacco  Allergies  Meds  Problems  Med Hx  Surg Hx  Fam Hx           Past Medical History:   Diagnosis Date     Community acquired bacterial pneumonia 12/12/2018     ED (erectile dysfunction) 12/11/2020     Gross hematuria 2003    hematospermia/gross hematuria Normal cystoscopy, normal CT abd discontinued aspirin     Headache, post-traumatic 2010    CT negative after fall     History of gout 01/17/2017    Gout exacerbation 2012     HTN (hypertension)      Hyperlipidemia      Lipoma of torso 01/17/2017    5cm Jan 2017     Personal history of colonic polyps     Positive Cologuard.  Colonoscopy January 2021 with multiple adenomatous polyps, repeat in 3 years     Pleural effusion associated with pulmonary infection     Hospitalized with community-acquired pneumonia and parapneumonic effusion treated with chest tube and IV antibiotics     Rosacea 01/17/2017     Tobacco abuse 01/17/2017    Low-dose CT chest negative for pulmonary nodules January 2017     Type 2 diabetes mellitus without complication (H)       Past Surgical History:   Procedure Laterality Date     ARTHROSCOPY KNEE      X 2     CATARACT EXTRACTION  2014     COLONOSCOPY      Normal colonoscopy 2003, has not had repeated since turning age 50 despite recommendation     IR PLEURAL DRAINAGE WITH CATHETER INSERTION  12/14/2018  "    POSTERIOR LAMINECTOMY / DECOMPRESSION CERVICAL SPINE  2002     STRIP VEIN       US THORACENTESIS  12/13/2018     US THORACENTESIS  2/4/2019       Review of Systems   Constitutional: Negative for chills.   HENT: Negative for congestion.    Cardiovascular: Negative for chest pain.   Gastrointestinal: Negative for abdominal pain and hematochezia.   Genitourinary: Negative for hematuria.     12 point review of systems is negative other than what is discussed in the assessment and plan    OBJECTIVE:   /71   Pulse 61   Ht 1.702 m (5' 7\")   Wt 97.2 kg (214 lb 4.8 oz)   SpO2 97%   BMI 33.56 kg/m      Physical Exam  EYES: Eyelids, conjunctiva, and sclera were normal. Pupils were normal. Cornea, iris, and lens were normal bilaterally.  HEAD, EARS, NOSE, MOUTH, AND THROAT: Head and face were normal. TMs and external auditory canals are normal  NECK: Neck appearance was normal. There were no neck masses and the thyroid was not enlarged and no nodules are felt.  No lymphadenopathy.  RESPIRATORY: Breathing pattern was normal and the chest moved symmetrically.   Lung sounds were normal and there were no rales or wheezes.  CARDIOVASCULAR: Heart rate and rhythm were normal.  S1 and S2 were normal and there were no extra sounds or murmurs. Peripheral pulses in arms and legs were normal.  There was no peripheral edema.  No carotid bruits.  GASTROINTESTINAL:  Bowel sounds were present.   Palpation detected no tenderness, mass, or enlarged organs.   MUSCULOSKELETAL: Skeletal configuration was normal and muscle mass was normal for age. Joint appearance was overall normal.  LYMPHATIC: There were no enlarged nodes.  SKIN/HAIR/NAILS: Skin color was normal.  There were no concerning skin lesions.  Rosacea.  NEUROLOGIC: The patient was alert and oriented to person, place, time, and circumstance. Speech was normal. Cranial nerves were normal. Motor strength was normal for age. The patient was normally coordinated.   Diabetic " foot exam: Good pedal pulses.  Normal monofilament exam and normal vibratory sensation.  PSYCHIATRIC:  Mood and affect were normal and the patient had normal recent and remote memory. The patient's judgment and insight were normal.        ASSESSMENT/PLAN:   1. Routine general medical examination at a health care facility  Immunizations are reviewed and providing first Shingrix vaccine today.  He will return in 2 to 6 months to receive second.  Also recommending getting a COVID booster and he will consider.  He is also eligible for Prevnar 20 and this is recommended.  He should get an annual flu shot..  Living will discussed.  Discussed smoking cessation.  Discussed using alcohol in moderation.  Regular exercise discussed.  Up to date with colonoscopies and this should be repeated in 2024 after multiple polyps removed in 2021.  Prostate exam is deferred but I will check a PSA for prostate cancer screening.   Recommending that he sees his ophthalmologist every year. Skin exam performed and recommending regular use of sunblock.  Recommending seeing a dermatologist every 1 to 2 years for total-body skin exam.  Hepatitis C antibody for screening was normal.      2. Type 2 diabetes mellitus without complication, without long-term current use of insulin (H)  Diabetes has been controlled without medication.  Rechecking A1c.  Encouraging him to get his annual diabetic eye exam completed soon.  Diabetic foot exam performed and there is no peripheral neuropathy.  Obtain microalbumin.  He is taking a statin daily.  We discussed strategies to lose weight with a goal of getting his BMI under 30 with exercise and diet modification.  - HEMOGLOBIN A1C; Future  - Albumin Random Urine Quantitative with Creat Ratio; Future  - atorvastatin (LIPITOR) 10 MG tablet; Take 1 tablet (10 mg) by mouth daily  Dispense: 90 tablet; Refill: 3  - HEMOGLOBIN A1C  - Albumin Random Urine Quantitative with Creat Ratio    3. Essential hypertension  Blood  pressure reasonably well controlled with current dose of lisinopril/HCTZ.  Monitor renal function and electrolytes  - lisinopril-hydrochlorothiazide (ZESTORETIC) 20-12.5 MG tablet; Take 2 tablets by mouth daily  Dispense: 180 tablet; Refill: 3  - CBC with platelets; Future  - Comprehensive metabolic panel (BMP + Alb, Alk Phos, ALT, AST, Total. Bili, TP); Future  - CBC with platelets  - Comprehensive metabolic panel (BMP + Alb, Alk Phos, ALT, AST, Total. Bili, TP)    4. Tobacco abuse  Unfortunately has resumed smoking.  A pack will last the week.  He had quit after his hospitalization in 2018.  We again discussed strategies to quit again.  He does have over a 20-pack-year history and I am recommending annual low-dose CT scan for lung cancer screening      Lung Cancer Screening Shared Decision Making Visit      Emile Osborne, a 62 year old male, is eligible for lung cancer screening           History   Smoking Status     Current Some Day Smoker     Packs/day: 1.00     Years: 35.00     Types: Cigarettes     Last attempt to quit: 12/11/2018   Smokeless Tobacco     Never Used       Comment: ONE PACK PER WEEK        I have discussed with patient the risks and benefits of screening for lung cancer with low-dose CT.      The risks include:     radiation exposure: one low dose chest CT has as much ionizing radiation as about 15 chest x-rays, or 6 months of background radiation living in [place]       false positives: most findings/nodules are NOT cancer, but some might still require additional diagnostic evaluation, including biopsy     over-diagnosis: some slow growing cancers that might never have been clinically significant will be detected and treated unnecessarily      The benefit of early detection of lung cancer is contingent upon adherence to annual screening or more frequent follow up if indicated.      Furthermore, to benefit from screening, Emile must be willing and able to undergo diagnostic procedures, if  indicated. Although no specific guide is available for determining severity of comorbidities, it is reasonable to withhold screening in patients who have greater mortality risk from other diseases.      We did discuss that the best way to prevent lung cancer is to not smoke.    5. Mixed hyperlipidemia  Recheck lipid profile on combination of atorvastatin and gemfibrozil.  Tolerating this combination without side effect.  - gemfibrozil (LOPID) 600 MG tablet; Take 1 tablet (600 mg) by mouth daily  Dispense: 90 tablet; Refill: 3  - Lipid Profile (Chol, Trig, HDL, LDL calc); Future  - Lipid Profile (Chol, Trig, HDL, LDL calc)    6. Erectile dysfunction, unspecified erectile dysfunction type  Viagra has been helpful and we will send a new prescription to his pharmacy  - sildenafil (VIAGRA) 100 MG tablet; Take 0.5-1 tablets ( mg) by mouth daily as needed (ED)  Dispense: 12 tablet; Refill: 11    7. History of gout  Watching his diet more carefully especially shellfish and has not had any significant gout attack in the past year    8. Rosacea  We discussed using MetroGel to control rosacea symptoms    9. Personal history of colonic polyps  Multiple polyps removed in 2021 and colonoscopy should be repeated after 3 years    10. Screening for prostate cancer  Exam is deferred as PSA has been historically low but will continue annual PSA for prostate cancer screening  - PSA, screen; Future  - PSA, screen    11. Encounter for therapeutic drug monitoring  Monitor electrolytes while on diuretic  - Magnesium; Future  - Magnesium    12. Personal history of tobacco use  As above  - CT Chest Lung Cancer Scrn Low Dose wo; Future    Patient has been advised of split billing requirements and indicates understanding: Yes    COUNSELING:   Reviewed preventive health counseling, as reflected in patient instructions       Regular exercise       Healthy diet/nutrition       Vision screening       Alcohol Use        Colorectal cancer  "screening       Prostate cancer screening    Estimated body mass index is 33.56 kg/m  as calculated from the following:    Height as of this encounter: 1.702 m (5' 7\").    Weight as of this encounter: 97.2 kg (214 lb 4.8 oz).         He reports that he has been smoking cigarettes. He has a 35.00 pack-year smoking history. He has never used smokeless tobacco.      Counseling Resources:  ATP IV Guidelines  Pooled Cohorts Equation Calculator  FRAX Risk Assessment  ICSI Preventive Guidelines  Dietary Guidelines for Americans, 2010  USDA's MyPlate  ASA Prophylaxis  Lung CA Screening    Mckinley Gibson MD  [facility]  "

## 2022-06-04 DIAGNOSIS — E83.42 HYPOMAGNESEMIA: Primary | ICD-10-CM

## 2022-06-04 RX ORDER — MAGNESIUM OXIDE 400 MG/1
400 TABLET ORAL DAILY
COMMUNITY
Start: 2022-06-04 | End: 2023-12-04

## 2022-07-02 DIAGNOSIS — I10 ESSENTIAL HYPERTENSION: ICD-10-CM

## 2022-07-02 RX ORDER — LISINOPRIL AND HYDROCHLOROTHIAZIDE 12.5; 2 MG/1; MG/1
2 TABLET ORAL DAILY
Qty: 180 TABLET | Refills: 3 | OUTPATIENT
Start: 2022-07-02

## 2022-08-29 DIAGNOSIS — E78.2 MIXED HYPERLIPIDEMIA: ICD-10-CM

## 2022-08-29 RX ORDER — GEMFIBROZIL 600 MG/1
TABLET, FILM COATED ORAL
Qty: 90 TABLET | Refills: 3 | OUTPATIENT
Start: 2022-08-29

## 2022-10-01 ENCOUNTER — HEALTH MAINTENANCE LETTER (OUTPATIENT)
Age: 62
End: 2022-10-01

## 2022-11-27 DIAGNOSIS — E78.2 MIXED HYPERLIPIDEMIA: ICD-10-CM

## 2022-11-28 RX ORDER — GEMFIBROZIL 600 MG/1
TABLET, FILM COATED ORAL
Qty: 90 TABLET | Refills: 3 | OUTPATIENT
Start: 2022-11-28

## 2023-02-05 ENCOUNTER — HEALTH MAINTENANCE LETTER (OUTPATIENT)
Age: 63
End: 2023-02-05

## 2023-03-01 ENCOUNTER — TRANSFERRED RECORDS (OUTPATIENT)
Dept: MULTI SPECIALTY CLINIC | Facility: CLINIC | Age: 63
End: 2023-03-01

## 2023-08-06 ENCOUNTER — HEALTH MAINTENANCE LETTER (OUTPATIENT)
Age: 63
End: 2023-08-06

## 2023-10-24 DIAGNOSIS — E11.9 TYPE 2 DIABETES MELLITUS WITHOUT COMPLICATION, WITHOUT LONG-TERM CURRENT USE OF INSULIN (H): ICD-10-CM

## 2023-10-24 DIAGNOSIS — I10 ESSENTIAL HYPERTENSION: ICD-10-CM

## 2023-10-24 RX ORDER — LISINOPRIL AND HYDROCHLOROTHIAZIDE 12.5; 2 MG/1; MG/1
2 TABLET ORAL DAILY
Qty: 180 TABLET | Refills: 0 | Status: SHIPPED | OUTPATIENT
Start: 2023-10-24 | End: 2023-12-04

## 2023-10-24 RX ORDER — ATORVASTATIN CALCIUM 10 MG/1
10 TABLET, FILM COATED ORAL DAILY
Qty: 90 TABLET | Refills: 0 | Status: SHIPPED | OUTPATIENT
Start: 2023-10-24 | End: 2023-12-04

## 2023-11-22 ENCOUNTER — PATIENT OUTREACH (OUTPATIENT)
Dept: GASTROENTEROLOGY | Facility: CLINIC | Age: 63
End: 2023-11-22
Payer: COMMERCIAL

## 2023-11-22 DIAGNOSIS — Z12.11 SPECIAL SCREENING FOR MALIGNANT NEOPLASMS, COLON: Primary | ICD-10-CM

## 2023-11-22 NOTE — LETTER
November 22, 2023      Charly Osborne  [address]  [facility] [place] [address]            Dear Charly,    We hope this letter finds you doing well.     Your health is important to us at [facility]. Our records indicate that you could be due, or possibly overdue, for a screening or surveillance colonoscopy if you have not had a colonoscopy since 1/25/21.     An order has been placed for you to have this completed. Our scheduling team will be reaching out to you to assist in getting this scheduled. If you do not hear from them within 7 days or you would like to schedule sooner, please call [phone], option 2 for endoscopy scheduling.     If you believe this is in error and have already had a colonoscopy done, please have your results and recommendations faxed to [phone].    If you have questions about this order or have further concerns, please reach out to your primary care provider.     Rodriguez     Colorectal Cancer RN Screening Team  [facility]

## 2023-11-22 NOTE — PROGRESS NOTES
"CRC Screening Colonoscopy Referral Review    Patient meets the inclusion criteria for screening colonoscopy standing order.    Ordering/Referring Provider:  Dr. Mckinley Gibson    BMI: Estimated body mass index is 33.56 kg/m  as calculated from the following:    Height as of 6/3/22: 1.702 m (5' 7\").    Weight as of 6/3/22: 97.2 kg (214 lb 4.8 oz).     Sedation:  Does patient have any of the following conditions affecting sedation?  No medical conditions affecting sedation.    Previous Scopes:  Any previous recommendations or follow up needs based on previous scope?  na / No recommendations.    Medical Concerns to Postpone Order:  Does patient have any of the following medical concerns that should postpone/delay colonoscopy referral?  No medical conditions affecting colonoscopy referral.    Final Referral Details:  Based on patient's medical history patient is appropriate for referral order with moderate sedation. If patient's BMI > 50 do not schedule in ASC.  "

## 2023-12-04 ENCOUNTER — OFFICE VISIT (OUTPATIENT)
Dept: INTERNAL MEDICINE | Facility: CLINIC | Age: 63
End: 2023-12-04
Payer: COMMERCIAL

## 2023-12-04 VITALS
HEIGHT: 67 IN | WEIGHT: 205 LBS | BODY MASS INDEX: 32.18 KG/M2 | RESPIRATION RATE: 18 BRPM | OXYGEN SATURATION: 99 % | TEMPERATURE: 97.6 F | HEART RATE: 60 BPM | SYSTOLIC BLOOD PRESSURE: 150 MMHG | DIASTOLIC BLOOD PRESSURE: 74 MMHG

## 2023-12-04 DIAGNOSIS — I10 PRIMARY HYPERTENSION: ICD-10-CM

## 2023-12-04 DIAGNOSIS — Z86.0100 PERSONAL HISTORY OF COLONIC POLYPS: ICD-10-CM

## 2023-12-04 DIAGNOSIS — E78.2 MIXED HYPERLIPIDEMIA: ICD-10-CM

## 2023-12-04 DIAGNOSIS — E11.9 TYPE 2 DIABETES MELLITUS WITHOUT COMPLICATION, WITHOUT LONG-TERM CURRENT USE OF INSULIN (H): ICD-10-CM

## 2023-12-04 DIAGNOSIS — N52.9 ERECTILE DYSFUNCTION, UNSPECIFIED ERECTILE DYSFUNCTION TYPE: ICD-10-CM

## 2023-12-04 DIAGNOSIS — Z00.00 ROUTINE GENERAL MEDICAL EXAMINATION AT A HEALTH CARE FACILITY: Primary | ICD-10-CM

## 2023-12-04 DIAGNOSIS — Z87.891 PERSONAL HISTORY OF TOBACCO USE: ICD-10-CM

## 2023-12-04 DIAGNOSIS — K21.9 GASTROESOPHAGEAL REFLUX DISEASE WITHOUT ESOPHAGITIS: ICD-10-CM

## 2023-12-04 LAB
ALBUMIN SERPL BCG-MCNC: 4.7 G/DL (ref 3.5–5.2)
ALBUMIN UR-MCNC: NEGATIVE MG/DL
ALP SERPL-CCNC: 73 U/L (ref 40–150)
ALT SERPL W P-5'-P-CCNC: 23 U/L (ref 0–70)
ANION GAP SERPL CALCULATED.3IONS-SCNC: 12 MMOL/L (ref 7–15)
APPEARANCE UR: CLEAR
AST SERPL W P-5'-P-CCNC: 21 U/L (ref 0–45)
BILIRUB SERPL-MCNC: 0.5 MG/DL
BILIRUB UR QL STRIP: NEGATIVE
BUN SERPL-MCNC: 10.2 MG/DL (ref 8–23)
CALCIUM SERPL-MCNC: 10.2 MG/DL (ref 8.8–10.2)
CHLORIDE SERPL-SCNC: 101 MMOL/L (ref 98–107)
CHOLEST SERPL-MCNC: 216 MG/DL
COLOR UR AUTO: YELLOW
CREAT SERPL-MCNC: 0.78 MG/DL (ref 0.67–1.17)
CREAT UR-MCNC: 19.5 MG/DL
DEPRECATED HCO3 PLAS-SCNC: 27 MMOL/L (ref 22–29)
EGFRCR SERPLBLD CKD-EPI 2021: >90 ML/MIN/1.73M2
ERYTHROCYTE [DISTWIDTH] IN BLOOD BY AUTOMATED COUNT: 12.1 % (ref 10–15)
GLUCOSE SERPL-MCNC: 119 MG/DL (ref 70–99)
GLUCOSE UR STRIP-MCNC: NEGATIVE MG/DL
HBA1C MFR BLD: 6.2 % (ref 0–5.6)
HCT VFR BLD AUTO: 40.5 % (ref 40–53)
HDLC SERPL-MCNC: 52 MG/DL
HGB BLD-MCNC: 14 G/DL (ref 13.3–17.7)
HGB UR QL STRIP: NEGATIVE
KETONES UR STRIP-MCNC: NEGATIVE MG/DL
LDLC SERPL CALC-MCNC: 95 MG/DL
LEUKOCYTE ESTERASE UR QL STRIP: NEGATIVE
MCH RBC QN AUTO: 30.5 PG (ref 26.5–33)
MCHC RBC AUTO-ENTMCNC: 34.6 G/DL (ref 31.5–36.5)
MCV RBC AUTO: 88 FL (ref 78–100)
MICROALBUMIN UR-MCNC: 30.9 MG/L
MICROALBUMIN/CREAT UR: 158.46 MG/G CR (ref 0–17)
NITRATE UR QL: NEGATIVE
NONHDLC SERPL-MCNC: 164 MG/DL
PH UR STRIP: 6 [PH] (ref 5–8)
PLATELET # BLD AUTO: 188 10E3/UL (ref 150–450)
POTASSIUM SERPL-SCNC: 4.9 MMOL/L (ref 3.4–5.3)
PROT SERPL-MCNC: 7.2 G/DL (ref 6.4–8.3)
PSA SERPL DL<=0.01 NG/ML-MCNC: 0.65 NG/ML (ref 0–4.5)
RBC # BLD AUTO: 4.59 10E6/UL (ref 4.4–5.9)
SODIUM SERPL-SCNC: 140 MMOL/L (ref 135–145)
SP GR UR STRIP: <=1.005 (ref 1–1.03)
TRIGL SERPL-MCNC: 347 MG/DL
UROBILINOGEN UR STRIP-ACNC: 0.2 E.U./DL
WBC # BLD AUTO: 10.3 10E3/UL (ref 4–11)

## 2023-12-04 PROCEDURE — 82570 ASSAY OF URINE CREATININE: CPT | Performed by: INTERNAL MEDICINE

## 2023-12-04 PROCEDURE — 80053 COMPREHEN METABOLIC PANEL: CPT | Performed by: INTERNAL MEDICINE

## 2023-12-04 PROCEDURE — G0103 PSA SCREENING: HCPCS | Performed by: INTERNAL MEDICINE

## 2023-12-04 PROCEDURE — 82043 UR ALBUMIN QUANTITATIVE: CPT | Performed by: INTERNAL MEDICINE

## 2023-12-04 PROCEDURE — 80061 LIPID PANEL: CPT | Performed by: INTERNAL MEDICINE

## 2023-12-04 PROCEDURE — 90750 HZV VACC RECOMBINANT IM: CPT | Performed by: INTERNAL MEDICINE

## 2023-12-04 PROCEDURE — 36415 COLL VENOUS BLD VENIPUNCTURE: CPT | Performed by: INTERNAL MEDICINE

## 2023-12-04 PROCEDURE — 83036 HEMOGLOBIN GLYCOSYLATED A1C: CPT | Performed by: INTERNAL MEDICINE

## 2023-12-04 PROCEDURE — 99215 OFFICE O/P EST HI 40 MIN: CPT | Mod: 25 | Performed by: INTERNAL MEDICINE

## 2023-12-04 PROCEDURE — 85027 COMPLETE CBC AUTOMATED: CPT | Performed by: INTERNAL MEDICINE

## 2023-12-04 PROCEDURE — 90471 IMMUNIZATION ADMIN: CPT | Performed by: INTERNAL MEDICINE

## 2023-12-04 PROCEDURE — 99396 PREV VISIT EST AGE 40-64: CPT | Mod: 25 | Performed by: INTERNAL MEDICINE

## 2023-12-04 PROCEDURE — 81003 URINALYSIS AUTO W/O SCOPE: CPT | Performed by: INTERNAL MEDICINE

## 2023-12-04 RX ORDER — SILDENAFIL 100 MG/1
50-100 TABLET, FILM COATED ORAL DAILY PRN
Qty: 12 TABLET | Refills: 3 | Status: SHIPPED | OUTPATIENT
Start: 2023-12-04

## 2023-12-04 RX ORDER — LISINOPRIL AND HYDROCHLOROTHIAZIDE 12.5; 2 MG/1; MG/1
2 TABLET ORAL DAILY
Qty: 60 TABLET | Refills: 11 | Status: SHIPPED | OUTPATIENT
Start: 2023-12-04

## 2023-12-04 RX ORDER — LISINOPRIL AND HYDROCHLOROTHIAZIDE 12.5; 2 MG/1; MG/1
2 TABLET ORAL DAILY
Qty: 180 TABLET | Refills: 3 | Status: SHIPPED | OUTPATIENT
Start: 2023-12-04 | End: 2023-12-04

## 2023-12-04 RX ORDER — AMLODIPINE BESYLATE 5 MG/1
5 TABLET ORAL DAILY
Qty: 30 TABLET | Refills: 11 | Status: SHIPPED | OUTPATIENT
Start: 2023-12-04

## 2023-12-04 RX ORDER — AMLODIPINE BESYLATE 5 MG/1
5 TABLET ORAL DAILY
Qty: 90 TABLET | Refills: 3 | Status: SHIPPED | OUTPATIENT
Start: 2023-12-04 | End: 2023-12-04

## 2023-12-04 RX ORDER — ATORVASTATIN CALCIUM 10 MG/1
10 TABLET, FILM COATED ORAL DAILY
Qty: 90 TABLET | Refills: 3 | Status: SHIPPED | OUTPATIENT
Start: 2023-12-04

## 2023-12-04 RX ORDER — GEMFIBROZIL 600 MG/1
600 TABLET, FILM COATED ORAL DAILY
Qty: 90 TABLET | Refills: 3 | Status: SHIPPED | OUTPATIENT
Start: 2023-12-04

## 2023-12-04 ASSESSMENT — ENCOUNTER SYMPTOMS
PALPITATIONS: 0
JOINT SWELLING: 0
FREQUENCY: 0
WEAKNESS: 0
COUGH: 0
ABDOMINAL PAIN: 0
HEADACHES: 0
EYE PAIN: 0
DIARRHEA: 0
CONSTIPATION: 0
DIZZINESS: 0
MYALGIAS: 0
SHORTNESS OF BREATH: 0
NAUSEA: 0
PARESTHESIAS: 0
HEMATOCHEZIA: 0
HEARTBURN: 0
NERVOUS/ANXIOUS: 0
FEVER: 0
DYSURIA: 0
CHILLS: 0
HEMATURIA: 0
ARTHRALGIAS: 0
SORE THROAT: 0

## 2023-12-04 NOTE — PROGRESS NOTES
SUBJECTIVE:   Charly is a 63 year old, presenting for the followin-year-old male here for his annual physical and to follow-up medical problems including hypertension, mixed hyperlipidemia, type 2 diabetes and erectile dysfunction.  See assessment and plan for details.    Physical (fasting)      62 minutes spent in the management of this patient including reviewing records, obtaining history, performing exam, ordering appropriate tests and documenting visit.    20 of those minutes spent addressing preventive and screening measures as part of annual physical and 42 minutes spent addressing chronic and new medical problems.  See assessment plan for details.      2023     9:19 AM   Additional Questions   Roomed by Araceli APONTE       Healthy Habits:     Getting at least 3 servings of Calcium per day:  Yes    Bi-annual eye exam:  Yes    Dental care twice a year:  NO    Sleep apnea or symptoms of sleep apnea:  None    Diet:  Regular (no restrictions)    Frequency of exercise:  None    Taking medications regularly:  Yes    Medication side effects:  None    Additional concerns today:  No      Today's PHQ-2 Score:       2023     9:12 AM   PHQ-2 (  [employer])   Q1: Little interest or pleasure in doing things 0   Q2: Feeling down, depressed or hopeless 0   PHQ-2 Score 0   Q1: Little interest or pleasure in doing things Not at all   Q2: Feeling down, depressed or hopeless Not at all   PHQ-2 Score 0                       Social History     Tobacco Use    Smoking status: Some Days     Packs/day: 1.00     Years: 35.00     Additional pack years: 0.00     Total pack years: 35.00     Types: Cigarettes, Cigars     Last attempt to quit: 2018     Years since quittin.9     Passive exposure: Current    Smokeless tobacco: Never    Tobacco comments:     ONE PACK PER WEEK   Substance Use Topics    Alcohol use: Yes     Comment: Alcoholic Drinks/day: 3/day             2023     9:12 AM   Alcohol Use   Prescreen: >3  drinks/day or >7 drinks/week? No       Last PSA:   Prostate Specific Antigen Screen   Date Value Ref Range Status   06/03/2022 0.55 0.00 - 4.50 ug/L Final       Reviewed orders with patient. Reviewed health maintenance and updated orders accordingly - Yes  Lab work is in process    Reviewed and updated as needed this visit by clinical staff   Tobacco  Allergies  Meds              Reviewed and updated as needed this visit by Provider                 Past Medical History:   Diagnosis Date    Community acquired bacterial pneumonia 12/12/2018    ED (erectile dysfunction) 12/11/2020    Gross hematuria 01/01/2003    hematospermia/gross hematuria Normal cystoscopy, normal CT abd discontinued aspirin    Headache, post-traumatic 01/01/2010    CT negative after fall    History of gout 01/17/2017    Gout exacerbation 2012    HTN (hypertension)     Hyperlipidemia     Lipoma of torso 01/17/2017    5cm Jan 2017    Personal history of colonic polyps     Positive Cologuard.  Colonoscopy January 2021 with multiple adenomatous polyps, repeat in 3 years    Pleural effusion associated with pulmonary infection     Hospitalized with community-acquired pneumonia and parapneumonic effusion treated with chest tube and IV antibiotics    Rosacea 01/17/2017    Screening for AAA (abdominal aortic aneurysm)     [facility] screening 2022 showing no AAA    Tobacco abuse 01/17/2017    Low-dose CT chest negative for pulmonary nodules January 2017    Type 2 diabetes mellitus without complication (H)       Past Surgical History:   Procedure Laterality Date    ARTHROSCOPY KNEE      X 2    CATARACT EXTRACTION  2014    COLONOSCOPY      Normal colonoscopy 2003, has not had repeated since turning age 50 despite recommendation    IR PLEURAL DRAINAGE WITH CATHETER INSERTION  12/14/2018    POSTERIOR LAMINECTOMY / DECOMPRESSION CERVICAL SPINE  2002    STRIP VEIN      US THORACENTESIS  12/13/2018    US THORACENTESIS  2/4/2019       Review of Systems  "  Constitutional:  Negative for chills and fever.   HENT:  Negative for congestion, ear pain, hearing loss and sore throat.    Eyes:  Negative for pain and visual disturbance.   Respiratory:  Negative for cough and shortness of breath.    Cardiovascular:  Negative for chest pain, palpitations and peripheral edema.   Gastrointestinal:  Negative for abdominal pain, constipation, diarrhea, heartburn, hematochezia and nausea.   Genitourinary:  Negative for dysuria, frequency, genital sores, hematuria, impotence, penile discharge and urgency.   Musculoskeletal:  Negative for arthralgias, joint swelling and myalgias.   Skin:  Negative for rash.   Neurological:  Negative for dizziness, weakness, headaches and paresthesias.   Psychiatric/Behavioral:  Negative for mood changes. The patient is not nervous/anxious.          OBJECTIVE:   BP (!) 150/74   Pulse 60   Temp 97.6  F (36.4  C) (Oral)   Resp 18   Ht 1.702 m (5' 7\")   Wt 93 kg (205 lb)   SpO2 99%   BMI 32.11 kg/m      Physical Exam  EYES: Eyelids, conjunctiva, and sclera were normal. Pupils were normal. Cornea, iris, and lens were normal bilaterally.  HEAD, EARS, NOSE, MOUTH, AND THROAT: Head and face were normal. TMs and external auditory canals are normal.  Oropharynx normal  NECK: Neck appearance was normal. There were no neck masses and the thyroid was not enlarged and no nodules are felt.  No lymphadenopathy.  RESPIRATORY: Breathing pattern was normal and the chest moved symmetrically.   Lung sounds were normal and there were no rales or wheezes.  CARDIOVASCULAR: Heart rate and rhythm were normal.  S1 and S2 were normal and there were no extra sounds or murmurs. Peripheral pulses in arms and legs were normal.  There was no peripheral edema.  No carotid bruits.  GASTROINTESTINAL:  Bowel sounds were present.   Palpation detected no tenderness, mass, or enlarged organs.   RECTAL/PROSTATE: Deferred  MUSCULOSKELETAL: Skeletal configuration was normal and muscle " mass was normal for age. Joint appearance was overall normal.  LYMPHATIC: There were no enlarged nodes.  SKIN/HAIR/NAILS: Skin color was normal.  There were no concerning skin lesions.  NEUROLOGIC: The patient was alert and oriented to person, place, time, and circumstance. Speech was normal. Cranial nerves were normal. Motor strength was normal for age. The patient was normally coordinated.  Sensation intact.  Diabetic foot exam: Good pedal pulses.  Normal vibratory sensation and normal monofilament exam  PSYCHIATRIC:  Mood and affect were normal and the patient had normal recent and remote memory. The patient's judgment and insight were normal.         ASSESSMENT/PLAN:   1. Routine general medical examination at a health care facility  Immunizations are reviewed and providing his second Shingrix vaccine today even though it has been over 6 months since his first.  Also recommending annual flu shot, COVID vaccination and he should consider getting new RSV vaccine.  Discussed smoking cessation.  Discussed using alcohol in moderation.  Regular exercise and healthy diet habits to maintain a healthy weight discussed.  Up to date with colonoscopies and this should be repeated in February 2024.  Prostate exam is deferred but I will check a PSA for prostate cancer screening.   Recommending that he sees his ophthalmologist every year.  Regular dental visits every 6 months discussed.  Skin exam performed and recommending regular use of sunblock.  We discussed seeing a dermatologist every 1 to 2 years.  Hepatitis C antibody for screening was normal.    - PSA, screen; Future  - PSA, screen    2. Primary hypertension  Blood pressure is not adequately controlled despite taking lisinopril/HCTZ 20/12.5 mg 2 tablets daily.  We discussed the importance of sodium restriction, regular exercise and weight loss.  However, also recommending that he start amlodipine 5 mg daily with a prescription sent to his pharmacy.  Follow-up visit in  3 months.  - CBC with platelets; Future  - Comprehensive metabolic panel (BMP + Alb, Alk Phos, ALT, AST, Total. Bili, TP); Future  - UA Macroscopic with reflex to Microscopic and Culture - Lab Collect; Future  - lisinopril-hydrochlorothiazide (ZESTORETIC) 20-12.5 MG tablet; Take 2 tablets by mouth daily  Dispense: 60 tablet; Refill: 11  - CBC with platelets  - Comprehensive metabolic panel (BMP + Alb, Alk Phos, ALT, AST, Total. Bili, TP)  - UA Macroscopic with reflex to Microscopic and Culture - Lab Collect  - amLODIPine (NORVASC) 5 MG tablet; Take 1 tablet (5 mg) by mouth daily  Dispense: 30 tablet; Refill: 11    3. Type 2 diabetes mellitus without complication, without long-term current use of insulin (H)  Diabetes has been adequately controlled without medication.  Rechecking A1c today.  Encouraging him to get his annual diabetic eye exam completed.  Diabetic foot exam performed and there is no peripheral neuropathy.  Will obtain microalbumin.  He is taking a statin daily.  Emphasized the importance of weight loss, diet and exercise.  - HEMOGLOBIN A1C; Future  - Albumin Random Urine Quantitative with Creat Ratio; Future  - atorvastatin (LIPITOR) 10 MG tablet; Take 1 tablet (10 mg) by mouth daily  Dispense: 90 tablet; Refill: 3  - HEMOGLOBIN A1C  - Albumin Random Urine Quantitative with Creat Ratio    4. Mixed hyperlipidemia  Rechecking lipid profile taking combination of gemfibrozil and atorvastatin.  Tolerating this combination without side effects.  - gemfibrozil (LOPID) 600 MG tablet; Take 1 tablet (600 mg) by mouth daily  Dispense: 90 tablet; Refill: 3    5. Erectile dysfunction, unspecified erectile dysfunction type  Sending a prescription for Viagra to his pharmacy  - sildenafil (VIAGRA) 100 MG tablet; Take 0.5-1 tablets ( mg) by mouth daily as needed (Erectile dysfunction)  Dispense: 12 tablet; Refill: 3    6. Gastroesophageal reflux disease without esophagitis  Reflux is adequately controlled with  famotidine    7. Personal history of tobacco use  Annual low-dose CT scan for lung cancer screening recommended.  He has 40-pack-year history.  Also discussed the importance of complete cessation.  Unfortunately he is not ready to quit at this time  - CT Chest Lung Cancer Scrn Low Dose wo; Future    Lung Cancer Screening Shared Decision Making Visit     Emile Osborne, a 63 year old male, is eligible for lung cancer screening    History   Smoking Status    Some Days    Packs/day: 1.00    Years: 35.00    Types: Cigarettes, Cigars    Last attempt to quit: 12/11/2018   Smokeless Tobacco    Never       I have discussed with patient the risks and benefits of screening for lung cancer with low-dose CT.     The risks include:    radiation exposure: one low dose chest CT has as much ionizing radiation as about 15 chest x-rays, or 6 months of background radiation living in [place]      false positives: most findings/nodules are NOT cancer, but some might still require additional diagnostic evaluation, including biopsy    over-diagnosis: some slow growing cancers that might never have been clinically significant will be detected and treated unnecessarily     The benefit of early detection of lung cancer is contingent upon adherence to annual screening or more frequent follow up if indicated.     Furthermore, to benefit from screening, Emile must be willing and able to undergo diagnostic procedures, if indicated. Although no specific guide is available for determining severity of comorbidities, it is reasonable to withhold screening in patients who have greater mortality risk from other diseases.     We did discuss that the best way to prevent lung cancer is to not smoke.      8. Personal history of colonic polyps  He will be due for a screening colonoscopy in February 2024 and I am providing him with a phone number to call to get that scheduled.    Patient has been advised of split billing requirements and indicates  "understanding: Yes        BMI:   Estimated body mass index is 32.11 kg/m  as calculated from the following:    Height as of this encounter: 1.702 m (5' 7\").    Weight as of this encounter: 93 kg (205 lb).         He reports that he has been smoking cigarettes and cigars. He has a 35.00 pack-year smoking history. He has been exposed to tobacco smoke. He has never used smokeless tobacco.  Nicotine/Tobacco Cessation Plan:   Information offered: Patient not interested at this time            Mckinley Gibson MD  [facility]      "

## 2023-12-04 NOTE — LETTER
December 5, 2023      Charly Osborne  [address]  [facility] [place] [address]        Dear Charly,    Triglycerides remain quite elevated but the LDL cholesterol looks good under 100.  I would continue your current doses of atorvastatin and gemfibrozil but I would work at making changes in your diet and exercise routine to help get your weight down 10-15 pounds.  Cutting back on alcohol will also help improve triglyceride levels.    Normal kidney function.  Normal liver studies.  Diabetes is well-controlled with A1c of 6.2%.  Your PSA remains low and is reassuring for no prostate cancer.  CBC is normal.  No anemia.  Urinalysis is normal.  Microalbumin is slightly elevated which needs to be monitored in the setting of diabetes and hypertension.  Keeping blood pressure better controlled will help this result.    Resulted Orders   HEMOGLOBIN A1C   Result Value Ref Range    Hemoglobin A1C 6.2 (H) 0.0 - 5.6 %      Comment:      Normal <5.7%   Prediabetes 5.7-6.4%    Diabetes 6.5% or higher     Note: Adopted from ADA consensus guidelines.   CBC with platelets   Result Value Ref Range    WBC Count 10.3 4.0 - 11.0 10e3/uL    RBC Count 4.59 4.40 - 5.90 10e6/uL    Hemoglobin 14.0 13.3 - 17.7 g/dL    Hematocrit 40.5 40.0 - 53.0 %    MCV 88 78 - 100 fL    MCH 30.5 26.5 - 33.0 pg    MCHC 34.6 31.5 - 36.5 g/dL    RDW 12.1 10.0 - 15.0 %    Platelet Count 188 150 - 450 10e3/uL   Comprehensive metabolic panel (BMP + Alb, Alk Phos, ALT, AST, Total. Bili, TP)   Result Value Ref Range    Sodium 140 135 - 145 mmol/L      Comment:      Reference intervals for this test were updated on 09/26/2023 to more accurately reflect our healthy population. There may be differences in the flagging of prior results with similar values performed with this method. Interpretation of those prior results can be made in the context of the updated reference intervals.     Potassium 4.9 3.4 - 5.3 mmol/L    Carbon Dioxide (CO2) 27 22 - 29 mmol/L    Anion Gap  12 7 - 15 mmol/L    Urea Nitrogen 10.2 8.0 - 23.0 mg/dL    Creatinine 0.78 0.67 - 1.17 mg/dL    GFR Estimate >90 >60 mL/min/1.73m2    Calcium 10.2 8.8 - 10.2 mg/dL    Chloride 101 98 - 107 mmol/L    Glucose 119 (H) 70 - 99 mg/dL    Alkaline Phosphatase 73 40 - 150 U/L      Comment:      Reference intervals for this test were updated on 11/14/2023 to more accurately reflect our healthy population. There may be differences in the flagging of prior results with similar values performed with this method. Interpretation of those prior results can be made in the context of the updated reference intervals.    AST 21 0 - 45 U/L      Comment:      Reference intervals for this test were updated on 6/12/2023 to more accurately reflect our healthy population. There may be differences in the flagging of prior results with similar values performed with this method. Interpretation of those prior results can be made in the context of the updated reference intervals.    ALT 23 0 - 70 U/L      Comment:      Reference intervals for this test were updated on 6/12/2023 to more accurately reflect our healthy population. There may be differences in the flagging of prior results with similar values performed with this method. Interpretation of those prior results can be made in the context of the updated reference intervals.      Protein Total 7.2 6.4 - 8.3 g/dL    Albumin 4.7 3.5 - 5.2 g/dL    Bilirubin Total 0.5 <=1.2 mg/dL   Lipid Profile (Chol, Trig, HDL, LDL calc)   Result Value Ref Range    Cholesterol 216 (H) <200 mg/dL    Triglycerides 347 (H) <150 mg/dL    Direct Measure HDL 52 >=40 mg/dL    LDL Cholesterol Calculated 95 <=100 mg/dL    Non HDL Cholesterol 164 (H) <130 mg/dL    Narrative    Cholesterol  Desirable:  <200 mg/dL    Triglycerides  Normal:  Less than 150 mg/dL  Borderline High:  150-199 mg/dL  High:  200-499 mg/dL  Very High:  Greater than or equal to 500 mg/dL    Direct Measure HDL  Female:  Greater than or equal to 50  mg/dL   Male:  Greater than or equal to 40 mg/dL    LDL Cholesterol  Desirable:  <100mg/dL  Above Desirable:  100-129 mg/dL   Borderline High:  130-159 mg/dL   High:  160-189 mg/dL   Very High:  >= 190 mg/dL    Non HDL Cholesterol  Desirable:  130 mg/dL  Above Desirable:  130-159 mg/dL  Borderline High:  160-189 mg/dL  High:  190-219 mg/dL  Very High:  Greater than or equal to 220 mg/dL   PSA, screen   Result Value Ref Range    Prostate Specific Antigen Screen 0.65 0.00 - 4.50 ng/mL    Narrative    This result is obtained using the Roche Elecsys total PSA method on the vern [id number] immunoassay analyzer. Results obtained with different assay methods or kits cannot be used interchangeably.   Albumin Random Urine Quantitative with Creat Ratio   Result Value Ref Range    Creatinine Urine mg/dL 19.5 mg/dL      Comment:      The reference ranges have not been established in urine creatinine. The results should be integrated into the clinical context for interpretation.    Albumin Urine mg/L 30.9 mg/L      Comment:      The reference ranges have not been established in urine albumin. The results should be integrated into the clinical context for interpretation.    Albumin Urine mg/g Cr 158.46 (H) 0.00 - 17.00 mg/g Cr      Comment:      Microalbuminuria is defined as an albumin:creatinine ratio of 17 to 299 for males and 25 to 299 for females. A ratio of albumin:creatinine of 300 or higher is indicative of overt proteinuria.  Due to biologic variability, positive results should be confirmed by a second, first-morning random or 24-hour timed urine specimen. If there is discrepancy, a third specimen is recommended. When 2 out of 3 results are in the microalbuminuria range, this is evidence for incipient nephropathy and warrants increased efforts at glucose control, blood pressure control, and institution of therapy with an angiotensin-converting-enzyme (ACE) inhibitor (if the patient can tolerate it).     UA Macroscopic with  reflex to Microscopic and Culture - Lab Collect   Result Value Ref Range    Color Urine Yellow Colorless, Straw, Light Yellow, Yellow    Appearance Urine Clear Clear    Glucose Urine Negative Negative mg/dL    Bilirubin Urine Negative Negative    Ketones Urine Negative Negative mg/dL    Specific Gravity Urine <=1.005 1.005 - 1.030    Blood Urine Negative Negative    pH Urine 6.0 5.0 - 8.0    Protein Albumin Urine Negative Negative mg/dL    Urobilinogen Urine 0.2 0.2, 1.0 E.U./dL    Nitrite Urine Negative Negative    Leukocyte Esterase Urine Negative Negative    Narrative    Microscopic not indicated       If you have any questions or concerns, please call the clinic at the number listed above.       Sincerely,      Mckinley Gibson MD

## 2023-12-04 NOTE — PATIENT INSTRUCTIONS
Preventive Health Recommendations  Male Ages 50 - 64    Yearly exam:             See your health care provider every year in order to  o   Review health changes.   o   Discuss preventive care.    o   Review your medicines if your doctor has prescribed any.   Cholesterol annually  You will be due for a colonoscopy in February 2024.  Please call [facility] at [phone] to schedule  PSA annually for prostate cancer screening  You should be tested each year for STDs (sexually transmitted diseases), if you re at risk.     Shots: Get a flu shot each year. Get a tetanus shot every 10 years.  Recommending COVID vaccination and you should also consider getting the new RSV vaccine.    Nutrition:  Eat at least 5 servings of fruits and vegetables daily.   Eat whole-grain bread, whole-wheat pasta and brown rice instead of white grains and rice.   Get adequate Calcium and Vitamin D.     Lifestyle  Exercise for at least 150 minutes a week (30 minutes a day, 5 days a week). This will help you control your weight and prevent disease.   Limit alcohol to one drink per day.   No smoking.  Recommending low-dose CT scan for lung cancer screening which should be completed annually  Wear sunscreen to prevent skin cancer.  Consider seeing a dermatologist every 1 to 2 years for a total-body skin exam  See your dentist every six months for an exam and cleaning.   See your eye doctor for an annual diabetic eye exam every year    Lung Cancer Screening   Frequently Asked Questions  If you are at high-risk for lung cancer, getting screened with low-dose computed tomography (LDCT) every year can help save your life. This handout offers answers to some of the most common questions about lung cancer screening. If you have other questions, please call [phone] ([phone]).     What is it?  Lung cancer screening uses special X-ray technology to create an image of your lung tissue. The exam is quick and easy and takes  less than 10 seconds. We don t give you any medicine or use any needles. You can eat before and after the exam. You don t need to change your clothes as long as the clothing on your chest doesn t contain metal. But, you do need to be able to hold your breath for at least 6 seconds during the exam.    What is the goal of lung cancer screening?  The goal of lung cancer screening is to save lives. Many times, lung cancer is not found until a person starts having physical symptoms. Lung cancer screening can help detect lung cancer in the earliest stages when it may be easier to treat.    Who should be screened for lung cancer?  We suggest lung cancer screening for anyone who is at high-risk for lung cancer. You are in the high-risk group if you:     are between the ages of 55 and 79, and   have smoked at least 1 pack of cigarettes a day for 20 or more years, and   still smoke or have quit within the past 15 years.    However, if you have a new cough or shortness of breath, you should talk to your doctor before being screened.    Why does it matter if I have symptoms?  Certain symptoms can be a sign that you have a condition in your lungs that should be checked and treated by your doctor. These symptoms include fever, chest pain, a new or changing cough, shortness of breath that you have never felt before, coughing up blood or unexplained weight loss. Having any of these symptoms can greatly affect the results of lung cancer screening.       Should all smokers get an LDCT lung cancer screening exam?  It depends. Lung cancer screening is for a very specific group of men and women who have a history of heavy smoking over a long period of time (see  Who should be screened for lung cancer  above).  I am in the high-risk group, but have been diagnosed with cancer in the past. Is LDCT lung cancer screening right for me?  In some cases, you should not have LDCT lung screening, such as when your doctor is already following your  cancer with CT scan studies. Your doctor will help you decide if LDCT lung screening is right for you.  Do I need to have a screening exam every year?  Yes. If you are in the high-risk group described earlier, you should get an LDCT lung cancer screening exam every year until you are 79, or are no longer willing or able to undergo screening and possible procedures to diagnose and treat lung cancer.  How effective is LDCT at preventing death from lung cancer?  Studies have shown that LDCT lung cancer screening can lower the risk of death from lung cancer by 20 percent in people who are at high-risk.  What are the risks?  There are some risks and limitations of LDCT lung cancer screening. We want to make sure you understand the risks and benefits, so please let us know if you have any questions. Your doctor may want to talk with you more about these risks.   Radiation exposure: As with any exam that uses radiation, there is a very small increased risk of cancer. The amount of radiation in LDCT is small--about the same amount a person would get from a mammogram. Your doctor orders the exam when he or she feels the potential benefits outweigh the risks.   False negatives: No test is perfect, including LDCT. It is possible that you may have a medical condition, including lung cancer, that is not found during your exam. This is called a false negative result.   False positives and more testing: LDCT very often finds something in the lung that could be cancer, but in fact is not. This is called a false positive result. False positive tests often cause anxiety. To make sure these findings are not cancer, you may need to have more tests. These tests will be done only if you give us permission. Sometimes patients need a treatment that can have side effects, such as a biopsy. For more information on false positives, see  What can I expect from the results?    Findings not related to lung cancer: Your LDCT exam also takes  pictures of areas of your body next to your lungs. In a very small number of cases, the CT scan will show an abnormal finding in one of these areas, such as your kidneys, adrenal glands, liver or thyroid. This finding may not be serious, but you may need more tests. Your doctor can help you decide what other tests you may need, if any.  What can I expect from the results?  About 1 out of 4 LDCT exams will find something that may need more tests. Most of the time, these findings are lung nodules. Lung nodules are very small collections of tissue in the lung. These nodules are very common, and the vast majority--more than 97 percent--are not cancer (benign). Most are normal lymph nodes or small areas of scarring from past infections.  But, if a small lung nodule is found to be cancer, the cancer can be cured more than 90 percent of the time. To know if the nodule is cancer, we may need to get more images before your next yearly screening exam. If the nodule has suspicious features (for example, it is large, has an odd shape or grows over time), we will refer you to a specialist for further testing.  Will my doctor also get the results?  Yes. Your doctor will get a copy of your results.  Is it okay to keep smoking now that there s a cancer screening exam?  No. Tobacco is one of the strongest cancer-causing agents. It causes not only lung cancer, but other cancers and cardiovascular (heart) diseases as well. The damage caused by smoking builds over time. This means that the longer you smoke, the higher your risk of disease. While it is never too late to quit, the sooner you quit, the better.  Where can I find help to quit smoking?  The best way to prevent lung cancer is to stop smoking. If you have already quit smoking, congratulations and keep it up! For help on quitting smoking, please call [facility] at 3-897-QUIT-NOW (1-562.201.6834) or the American Cancer Society at 1-345.517.4587 to find local resources near  you.  One-on-one health coaching:  If you d prefer to work individually with a health care provider on tobacco cessation, we offer:     Medication Therapy Management:  Our specially trained pharmacists work closely with you and your doctor to help you quit smoking.  Call [phone] or [phone] (toll free).

## 2023-12-06 DIAGNOSIS — I10 PRIMARY HYPERTENSION: ICD-10-CM

## 2023-12-06 RX ORDER — AMLODIPINE BESYLATE 5 MG/1
5 TABLET ORAL DAILY
Qty: 90 TABLET | OUTPATIENT
Start: 2023-12-06

## 2024-07-21 ENCOUNTER — HEALTH MAINTENANCE LETTER (OUTPATIENT)
Age: 64
End: 2024-07-21

## 2024-11-21 ENCOUNTER — PATIENT OUTREACH (OUTPATIENT)
Dept: GASTROENTEROLOGY | Facility: CLINIC | Age: 64
End: 2024-11-21
Payer: COMMERCIAL

## 2024-11-21 DIAGNOSIS — Z12.11 SPECIAL SCREENING FOR MALIGNANT NEOPLASMS, COLON: Primary | ICD-10-CM

## 2024-11-21 NOTE — LETTER
November 21, 2024      Emile Osborne  [address]  [facility] [place] [address]              Dear Charly,    We hope this letter finds you doing well.     Your health is important to us at [facility]. Our records indicate that you could be due, or possibly overdue, for a screening or surveillance colonoscopy if you have not had a colonoscopy since 2021.     An order has been placed for you to have this completed. Our scheduling team will be reaching out to you to assist in getting this scheduled. If you do not hear from them within 7 days or you would like to schedule sooner, please call [phone], option 2 for endoscopy scheduling.     If you believe this is in error and have already had a colonoscopy done, please have your results and recommendations faxed to [phone].    If you have questions about this order or have further concerns, please reach out to your primary care provider.     Rodriguez     Colorectal Cancer RN Screening Team  [facility]

## 2024-11-21 NOTE — PROGRESS NOTES
"CRC Screening Colonoscopy Referral Review    Patient meets the inclusion criteria for screening colonoscopy standing order.    Ordering/Referring Provider:  Mckinley Gibson      BMI: Estimated body mass index is 32.11 kg/m  as calculated from the following:    Height as of 12/4/23: 1.702 m (5' 7\").    Weight as of 12/4/23: 93 kg (205 lb).     Sedation:  Does patient have any of the following conditions affecting sedation?  No medical conditions affecting sedation.    Previous Scopes:  Any previous recommendations or follow up needs based on previous scope?  na / No recommendations.    Medical Concerns to Postpone Order:  Does patient have any of the following medical concerns that should postpone/delay colonoscopy referral?  No medical conditions affecting colonoscopy referral.    Final Referral Details:  Based on patient's medical history patient is appropriate for referral order with moderate sedation. If patient's BMI > 50 do not schedule in [facility].  "

## 2024-12-07 DIAGNOSIS — E78.2 MIXED HYPERLIPIDEMIA: ICD-10-CM

## 2024-12-09 RX ORDER — GEMFIBROZIL 600 MG/1
600 TABLET, FILM COATED ORAL DAILY
Qty: 90 TABLET | Refills: 3 | Status: SHIPPED | OUTPATIENT
Start: 2024-12-09

## 2025-01-18 ENCOUNTER — HEALTH MAINTENANCE LETTER (OUTPATIENT)
Age: 65
End: 2025-01-18

## 2025-01-22 DIAGNOSIS — E78.2 MIXED HYPERLIPIDEMIA: ICD-10-CM

## 2025-01-22 RX ORDER — GEMFIBROZIL 600 MG/1
600 TABLET, FILM COATED ORAL DAILY
Qty: 90 TABLET | Refills: 3 | OUTPATIENT
Start: 2025-01-22

## 2025-01-22 NOTE — TELEPHONE ENCOUNTER
Medication Question or Refill    Contacts       Contact Date/Time Type Contact Phone/Fax    01/22/2025 09:06 AM CST Phone (Incoming) Charly Osborne (Self) [phone] (H)            What medication are you calling about (include dose and sig)?: Gemfibrozil 600mg    Preferred Pharmacy:   [id number] [place] [address] AT [address]  [address]  [facility] [place] [address]  Phone: [phone] Fax: [phone]      Controlled Substance Agreement on file:   CSA -- Patient Level:    CSA: None found at the patient level.       Who prescribed the medication?: PCP    Do you need a refill? Yes    When did you use the medication last? About 2 weeks ago     Patient offered an appointment? No    Do you have any questions or concerns?  Yes: been out for 2 weeks       Could we send this information to you in [facility] or would you prefer to receive a phone call?:   Patient would prefer a phone call   Okay to leave a detailed message?: Yes at Home number on file [phone] (work)

## 2025-02-15 ENCOUNTER — HEALTH MAINTENANCE LETTER (OUTPATIENT)
Age: 65
End: 2025-02-15

## 2025-02-17 DIAGNOSIS — I10 PRIMARY HYPERTENSION: ICD-10-CM

## 2025-02-17 DIAGNOSIS — N52.9 ERECTILE DYSFUNCTION, UNSPECIFIED ERECTILE DYSFUNCTION TYPE: ICD-10-CM

## 2025-02-17 DIAGNOSIS — E11.9 TYPE 2 DIABETES MELLITUS WITHOUT COMPLICATION, WITHOUT LONG-TERM CURRENT USE OF INSULIN (H): ICD-10-CM

## 2025-02-17 RX ORDER — SILDENAFIL 100 MG/1
50-100 TABLET, FILM COATED ORAL DAILY PRN
Qty: 12 TABLET | Refills: 0 | Status: SHIPPED | OUTPATIENT
Start: 2025-02-17

## 2025-02-17 RX ORDER — LISINOPRIL AND HYDROCHLOROTHIAZIDE 12.5; 2 MG/1; MG/1
2 TABLET ORAL DAILY
Qty: 180 TABLET | Refills: 0 | Status: SHIPPED | OUTPATIENT
Start: 2025-02-17

## 2025-02-17 RX ORDER — ATORVASTATIN CALCIUM 10 MG/1
10 TABLET, FILM COATED ORAL DAILY
Qty: 90 TABLET | Refills: 0 | Status: SHIPPED | OUTPATIENT
Start: 2025-02-17

## 2025-03-04 ENCOUNTER — OFFICE VISIT (OUTPATIENT)
Dept: INTERNAL MEDICINE | Facility: CLINIC | Age: 65
End: 2025-03-04
Payer: COMMERCIAL

## 2025-03-04 VITALS
HEIGHT: 67 IN | BODY MASS INDEX: 32.8 KG/M2 | DIASTOLIC BLOOD PRESSURE: 70 MMHG | SYSTOLIC BLOOD PRESSURE: 144 MMHG | TEMPERATURE: 98 F | HEART RATE: 65 BPM | RESPIRATION RATE: 16 BRPM | OXYGEN SATURATION: 98 % | WEIGHT: 209 LBS

## 2025-03-04 DIAGNOSIS — E11.9 TYPE 2 DIABETES MELLITUS WITHOUT COMPLICATION, WITHOUT LONG-TERM CURRENT USE OF INSULIN (H): ICD-10-CM

## 2025-03-04 DIAGNOSIS — Z86.0100 PERSONAL HISTORY OF COLON POLYPS, UNSPECIFIED: ICD-10-CM

## 2025-03-04 DIAGNOSIS — I10 PRIMARY HYPERTENSION: ICD-10-CM

## 2025-03-04 DIAGNOSIS — Z87.891 PERSONAL HISTORY OF TOBACCO USE: ICD-10-CM

## 2025-03-04 DIAGNOSIS — N52.9 ERECTILE DYSFUNCTION, UNSPECIFIED ERECTILE DYSFUNCTION TYPE: ICD-10-CM

## 2025-03-04 DIAGNOSIS — Z51.81 ENCOUNTER FOR THERAPEUTIC DRUG MONITORING: ICD-10-CM

## 2025-03-04 DIAGNOSIS — E78.2 MIXED HYPERLIPIDEMIA: ICD-10-CM

## 2025-03-04 DIAGNOSIS — Z12.5 SCREENING FOR PROSTATE CANCER: ICD-10-CM

## 2025-03-04 DIAGNOSIS — Z12.11 COLON CANCER SCREENING: ICD-10-CM

## 2025-03-04 DIAGNOSIS — Z00.00 ROUTINE GENERAL MEDICAL EXAMINATION AT A HEALTH CARE FACILITY: Primary | ICD-10-CM

## 2025-03-04 DIAGNOSIS — K21.9 GASTROESOPHAGEAL REFLUX DISEASE WITHOUT ESOPHAGITIS: ICD-10-CM

## 2025-03-04 LAB
ALBUMIN SERPL BCG-MCNC: 4.6 G/DL (ref 3.5–5.2)
ALBUMIN UR-MCNC: 30 MG/DL
ALP SERPL-CCNC: 72 U/L (ref 40–150)
ALT SERPL W P-5'-P-CCNC: 18 U/L (ref 0–70)
ANION GAP SERPL CALCULATED.3IONS-SCNC: 12 MMOL/L (ref 7–15)
APPEARANCE UR: CLEAR
AST SERPL W P-5'-P-CCNC: 25 U/L (ref 0–45)
BILIRUB SERPL-MCNC: 0.6 MG/DL
BILIRUB UR QL STRIP: NEGATIVE
BUN SERPL-MCNC: 13.4 MG/DL (ref 8–23)
CALCIUM SERPL-MCNC: 10.2 MG/DL (ref 8.8–10.4)
CHLORIDE SERPL-SCNC: 98 MMOL/L (ref 98–107)
CHOLEST SERPL-MCNC: 208 MG/DL
COLOR UR AUTO: YELLOW
CREAT SERPL-MCNC: 0.81 MG/DL (ref 0.67–1.17)
CREAT UR-MCNC: 76.8 MG/DL
EGFRCR SERPLBLD CKD-EPI 2021: >90 ML/MIN/1.73M2
ERYTHROCYTE [DISTWIDTH] IN BLOOD BY AUTOMATED COUNT: 11.8 % (ref 10–15)
EST. AVERAGE GLUCOSE BLD GHB EST-MCNC: 137 MG/DL
FASTING STATUS PATIENT QL REPORTED: YES
FASTING STATUS PATIENT QL REPORTED: YES
GLUCOSE SERPL-MCNC: 131 MG/DL (ref 70–99)
GLUCOSE UR STRIP-MCNC: NEGATIVE MG/DL
HBA1C MFR BLD: 6.4 % (ref 0–5.6)
HCO3 SERPL-SCNC: 27 MMOL/L (ref 22–29)
HCT VFR BLD AUTO: 42.2 % (ref 40–53)
HDLC SERPL-MCNC: 46 MG/DL
HGB BLD-MCNC: 14.5 G/DL (ref 13.3–17.7)
HGB UR QL STRIP: NEGATIVE
KETONES UR STRIP-MCNC: NEGATIVE MG/DL
LDLC SERPL CALC-MCNC: 84 MG/DL
LEUKOCYTE ESTERASE UR QL STRIP: NEGATIVE
MAGNESIUM SERPL-MCNC: 1.9 MG/DL (ref 1.7–2.3)
MCH RBC QN AUTO: 30.2 PG (ref 26.5–33)
MCHC RBC AUTO-ENTMCNC: 34.4 G/DL (ref 31.5–36.5)
MCV RBC AUTO: 88 FL (ref 78–100)
MICROALBUMIN UR-MCNC: 95.3 MG/L
MICROALBUMIN/CREAT UR: 124.09 MG/G CR (ref 0–17)
NITRATE UR QL: NEGATIVE
NONHDLC SERPL-MCNC: 162 MG/DL
PH UR STRIP: 7 [PH] (ref 5–8)
PLATELET # BLD AUTO: 195 10E3/UL (ref 150–450)
POTASSIUM SERPL-SCNC: 3.9 MMOL/L (ref 3.4–5.3)
PROT SERPL-MCNC: 7.3 G/DL (ref 6.4–8.3)
PSA SERPL DL<=0.01 NG/ML-MCNC: 0.71 NG/ML (ref 0–4.5)
RBC # BLD AUTO: 4.8 10E6/UL (ref 4.4–5.9)
RBC #/AREA URNS AUTO: NORMAL /HPF
SODIUM SERPL-SCNC: 137 MMOL/L (ref 135–145)
SP GR UR STRIP: 1.02 (ref 1–1.03)
TRIGL SERPL-MCNC: 390 MG/DL
TSH SERPL DL<=0.005 MIU/L-ACNC: 0.95 UIU/ML (ref 0.3–4.2)
UROBILINOGEN UR STRIP-ACNC: 0.2 E.U./DL
WBC # BLD AUTO: 9.6 10E3/UL (ref 4–11)
WBC #/AREA URNS AUTO: NORMAL /HPF

## 2025-03-04 RX ORDER — AMLODIPINE BESYLATE 2.5 MG/1
2.5 TABLET ORAL DAILY
Qty: 90 TABLET | Refills: 3 | Status: SHIPPED | OUTPATIENT
Start: 2025-03-04

## 2025-03-04 RX ORDER — LISINOPRIL AND HYDROCHLOROTHIAZIDE 12.5; 2 MG/1; MG/1
2 TABLET ORAL DAILY
Qty: 180 TABLET | Refills: 3 | Status: SHIPPED | OUTPATIENT
Start: 2025-03-04

## 2025-03-04 RX ORDER — ATORVASTATIN CALCIUM 10 MG/1
10 TABLET, FILM COATED ORAL DAILY
Qty: 90 TABLET | Refills: 3 | Status: SHIPPED | OUTPATIENT
Start: 2025-03-04

## 2025-03-04 RX ORDER — GEMFIBROZIL 600 MG/1
600 TABLET, FILM COATED ORAL DAILY
Qty: 90 TABLET | Refills: 3 | Status: SHIPPED | OUTPATIENT
Start: 2025-03-04

## 2025-03-04 SDOH — HEALTH STABILITY: PHYSICAL HEALTH: ON AVERAGE, HOW MANY DAYS PER WEEK DO YOU ENGAGE IN MODERATE TO STRENUOUS EXERCISE (LIKE A BRISK WALK)?: 5 DAYS

## 2025-03-04 SDOH — HEALTH STABILITY: PHYSICAL HEALTH: ON AVERAGE, HOW MANY MINUTES DO YOU ENGAGE IN EXERCISE AT THIS LEVEL?: 60 MIN

## 2025-03-04 ASSESSMENT — SOCIAL DETERMINANTS OF HEALTH (SDOH): HOW OFTEN DO YOU GET TOGETHER WITH FRIENDS OR RELATIVES?: THREE TIMES A WEEK

## 2025-03-04 NOTE — LETTER
March 4, 2025      Charly Osborne  [address]  [facility] [place] [address]        Dear Charly,    Your triglycerides remain moderately elevated but overall your cholesterol is well-controlled including the LDL of 84.  Weight loss with diet and exercise will improve your triglyceride levels.    Diabetes remains reasonably well-controlled with your A1c of 6.4%.  Your PSA remains low and is reassuring for no prostate cancer.  Normal kidney function.  Normal liver studies.  Normal thyroid function.  Normal magnesium level.    You do have an elevated microalbumin level in your urine and this is monitored in the setting of hypertension and diabetes.  Keeping blood pressure better controlled and continuing the use of lisinopril is helpful to prevent the development of future kidney problems.    I would like you to return for the nurse appointment on April 15 to recheck your blood pressure to make sure that the new medication amlodipine is doing the job getting your systolic blood pressure under 130.    Resulted Orders   HEMOGLOBIN A1C   Result Value Ref Range    Estimated Average Glucose 137 (H) <117 mg/dL    Hemoglobin A1C 6.4 (H) 0.0 - 5.6 %      Comment:      Normal <5.7%   Prediabetes 5.7-6.4%    Diabetes 6.5% or higher     Note: Adopted from ADA consensus guidelines.   Albumin Random Urine Quantitative with Creat Ratio   Result Value Ref Range    Creatinine Urine mg/dL 76.8 mg/dL      Comment:      The reference ranges have not been established in urine creatinine. The results should be integrated into the clinical context for interpretation.    Albumin Urine mg/L 95.3 mg/L      Comment:      The reference ranges have not been established in urine albumin. The results should be integrated into the clinical context for interpretation.    Albumin Urine mg/g Cr 124.09 (H) 0.00 - 17.00 mg/g Cr      Comment:      Microalbuminuria is defined as an albumin:creatinine ratio of 17 to 299 for males and 25 to 299 for females.  A ratio of albumin:creatinine of 300 or higher is indicative of overt proteinuria.  Due to biologic variability, positive results should be confirmed by a second, first-morning random or 24-hour timed urine specimen. If there is discrepancy, a third specimen is recommended. When 2 out of 3 results are in the microalbuminuria range, this is evidence for incipient nephropathy and warrants increased efforts at glucose control, blood pressure control, and institution of therapy with an angiotensin-converting-enzyme (ACE) inhibitor (if the patient can tolerate it).     CBC with platelets   Result Value Ref Range    WBC Count 9.6 4.0 - 11.0 10e3/uL    RBC Count 4.80 4.40 - 5.90 10e6/uL    Hemoglobin 14.5 13.3 - 17.7 g/dL    Hematocrit 42.2 40.0 - 53.0 %    MCV 88 78 - 100 fL    MCH 30.2 26.5 - 33.0 pg    MCHC 34.4 31.5 - 36.5 g/dL    RDW 11.8 10.0 - 15.0 %    Platelet Count 195 150 - 450 10e3/uL   Comprehensive metabolic panel (BMP + Alb, Alk Phos, ALT, AST, Total. Bili, TP)   Result Value Ref Range    Sodium 137 135 - 145 mmol/L    Potassium 3.9 3.4 - 5.3 mmol/L    Carbon Dioxide (CO2) 27 22 - 29 mmol/L    Anion Gap 12 7 - 15 mmol/L    Urea Nitrogen 13.4 8.0 - 23.0 mg/dL    Creatinine 0.81 0.67 - 1.17 mg/dL    GFR Estimate >90 >60 mL/min/1.73m2      Comment:      eGFR calculated using 2021 CKD-EPI equation.    Calcium 10.2 8.8 - 10.4 mg/dL    Chloride 98 98 - 107 mmol/L    Glucose 131 (H) 70 - 99 mg/dL    Alkaline Phosphatase 72 40 - 150 U/L    AST 25 0 - 45 U/L    ALT 18 0 - 70 U/L    Protein Total 7.3 6.4 - 8.3 g/dL    Albumin 4.6 3.5 - 5.2 g/dL    Bilirubin Total 0.6 <=1.2 mg/dL    Patient Fasting > 8hrs? Yes    Lipid Profile (Chol, Trig, HDL, LDL calc)   Result Value Ref Range    Cholesterol 208 (H) <200 mg/dL    Triglycerides 390 (H) <150 mg/dL    Direct Measure HDL 46 >=40 mg/dL    LDL Cholesterol Calculated 84 <100 mg/dL    Non HDL Cholesterol 162 (H) <130 mg/dL    Patient Fasting > 8hrs? Yes     Narrative     Cholesterol  Desirable: < 200 mg/dL  Borderline High: 200 - 239 mg/dL  High: >= 240 mg/dL    Triglycerides  Normal: < 150 mg/dL  Borderline High: 150 - 199 mg/dL  High: 200-499 mg/dL  Very High: >= 500 mg/dL    Direct Measure HDL  Female: >= 50 mg/dL   Male: >= 40 mg/dL    LDL Cholesterol  Desirable: < 100 mg/dL  Above Desirable: 100 - 129 mg/dL   Borderline High: 130 - 159 mg/dL   High:  160 - 189 mg/dL   Very High: >= 190 mg/dL    Non HDL Cholesterol  Desirable: < 130 mg/dL  Above Desirable: 130 - 159 mg/dL  Borderline High: 160 - 189 mg/dL  High: 190 - 219 mg/dL  Very High: >= 220 mg/dL   PSA, screen   Result Value Ref Range    Prostate Specific Antigen Screen 0.71 0.00 - 4.50 ng/mL    Narrative    This result is obtained using the Roche Elecsys total PSA method on the vern e801 immunoassay analyzer, which is an ultrasensitive method. Results obtained with different assay methods or kits cannot be used interchangeably.  This test is intended for initial prostate cancer screening. PSA values exceeding the age-specific limits are suspicious for prostate disease, but additional testing, such as prostate biopsy, is needed to diagnose prostate pathology. The American Cancer Society recommends annual examination with digital rectal examination and serum PSA beginning at age 50 and for men with a life expectancy of at least 10 years after detection of prostate cancer. For men in high-risk groups, such as  Americans or men with a first-degree relative diagnosed at a younger age, testing should begin at a younger age. It is generally recommended that information be provided to patients about the benefits and limitations of testing and treatment so they can make informed decisions.   TSH with free T4 reflex   Result Value Ref Range    TSH 0.95 0.30 - 4.20 uIU/mL   UA Macroscopic with reflex to Microscopic and Culture - Lab Collect   Result Value Ref Range    Color Urine Yellow Colorless, Straw, Light Yellow, Yellow     Appearance Urine Clear Clear    Glucose Urine Negative Negative mg/dL    Bilirubin Urine Negative Negative    Ketones Urine Negative Negative mg/dL    Specific Gravity Urine 1.020 1.005 - 1.030    Blood Urine Negative Negative    pH Urine 7.0 5.0 - 8.0    Protein Albumin Urine 30 (A) Negative mg/dL    Urobilinogen Urine 0.2 0.2, 1.0 E.U./dL    Nitrite Urine Negative Negative    Leukocyte Esterase Urine Negative Negative   Magnesium   Result Value Ref Range    Magnesium 1.9 1.7 - 2.3 mg/dL   UA Microscopic with Reflex to Culture   Result Value Ref Range    RBC Urine None Seen 0-2 /HPF /HPF    WBC Urine None Seen 0-5 /HPF /HPF    Narrative    Urine Culture not indicated       If you have any questions or concerns, please call the clinic at the number listed above.       Sincerely,      Mckinley Gibson MD    Electronically signed

## 2025-03-04 NOTE — PROGRESS NOTES
Preventive Care Visit  [facility]  Mckinley Gibson MD, Internal Medicine  Mar 4, 2025      Assessment & Plan     Routine general medical examination at a health care facility  Immunizations are reviewed and providing flu shot.  Declines COVID vaccination.  I am recommending getting Prevnar 20 completed and he will need a tetanus booster this year.  Living will discussed.  Information provided.  Discussed smoking cessation.  Uses alcohol in moderation.  Regular exercise and good diet habits to maintain a healthy weight discussed.  Overdue for a screening colonoscopy and I am asking him to get that scheduled with phone number provided.  Prostate exam is deferred but I will check a PSA for prostate cancer screening.  Dementia and depression screening completed.  Recommending getting an annual eye exam completed.  Seeing a dentist every 6 months recommended.  Skin exam performed and recommending regular use of sunblock.  Hepatitis C antibody for screening was normal.  Previous ultrasound was negative for AAA in 2022.  - PSA, screen; Future    Primary hypertension  Blood pressure is above goal.  He continues on lisinopril/HCTZ taking 2 tablets daily but did not start amlodipine as recommended at his last physical.  Goal is to get systolic under 130 and I am recommending that he start 2.5 mg of amlodipine every day.  Will have him back for an RN visit in 4-6 weeks.  Checking appropriate labs.  - lisinopril-hydrochlorothiazide (ZESTORETIC) 20-12.5 MG tablet; Take 2 tablets by mouth daily.  - CBC with platelets; Future  - TSH with free T4 reflex; Future  - UA Macroscopic with reflex to Microscopic and Culture - Lab Collect; Future  - amLODIPine (NORVASC) 2.5 MG tablet; Take 1 tablet (2.5 mg) by mouth daily.    Mixed hyperlipidemia  Rechecking lipid profile on combination of atorvastatin and gemfibrozil tolerating this combination without any side effects.  - gemfibrozil (LOPID) 600 MG tablet;  Take 1 tablet (600 mg) by mouth daily.  - atorvastatin (LIPITOR) 10 MG tablet; Take 1 tablet (10 mg) by mouth daily.  - Lipid Profile (Chol, Trig, HDL, LDL calc); Future    Type 2 diabetes mellitus without complication, without long-term current use of insulin (H)  So far diet controlled.  Rechecking A1c.  Diabetic foot exam completed.  Obtain microalbumin.  Recommending diabetic eye exam.  He is taking a statin daily  - HEMOGLOBIN A1C; Future  - Albumin Random Urine Quantitative with Creat Ratio; Future    Personal history of tobacco use  Counseled him on importance of smoking cessation.  He is not ready to quit.  He would be a candidate for Chantix and we discussed how this medication works    Gastroesophageal reflux disease without esophagitis  He will take famotidine as needed    Erectile dysfunction, unspecified erectile dysfunction type  Viagra as needed        Colon cancer screening  Multiple polyps removed in 2021 and he is overdue to get another screening colonoscopy completed.  Phone number provided for him to get that scheduled and I am sending a referral  - Colonoscopy Screening  Referral; Future    Encounter for therapeutic drug monitoring  Monitor renal function and electrolytes while taking diuretic  - Comprehensive metabolic panel (BMP + Alb, Alk Phos, ALT, AST, Total. Bili, TP); Future  - Magnesium; Future    Screening for prostate cancer    - PSA, screen; Future    Patient has been advised of split billing requirements and indicates understanding: Yes        Nicotine/Tobacco Cessation  He reports that he has been smoking cigarettes and cigars. He started smoking about 41 years ago. He has a 35 pack-year smoking history. He has been exposed to tobacco smoke. He has never used smokeless tobacco.  Nicotine/Tobacco Cessation Plan  Information offered: Patient not interested at this time      BMI  Estimated body mass index is 32.73 kg/m  as calculated from the following:    Height as of this  "encounter: 1.702 m (5' 7\").    Weight as of this encounter: 94.8 kg (209 lb).       Counseling  Appropriate preventive services were addressed with this patient via screening, questionnaire, or discussion as appropriate for fall prevention, nutrition, physical activity, Tobacco-use cessation, social engagement, weight loss and cognition.  Checklist reviewing preventive services available has been given to the patient.  Reviewed patient's diet, addressing concerns and/or questions.   The patient was instructed to see the dentist every 6 months.       See Patient Instructions    Tricia Parks is a 65 year old, presenting for the following: Here for his annual physical and to follow-up medical problems including hypertension, mixed hyperlipidemia, type 2 diabetes.  See assessment and plan for details  Physical (AWV, fasting, not on medicare)        3/4/2025     7:04 AM   Additional Questions   Roomed by         Via the Health Maintenance questionnaire, the patient has reported the following services have been completed -Colonscopy: mngi 2023-03-01, this information has been sent to the abstraction team.      Advance Care Planning  Patient does not have a Health Care Directive: Discussed advance care planning with patient; information given to patient to review.      3/4/2025   General Health   How would you rate your overall physical health? Excellent   Feel stress (tense, anxious, or unable to sleep) Not at all         3/4/2025   Nutrition   Diet: Regular (no restrictions)         3/4/2025   Exercise   Days per week of moderate/strenous exercise 5 days   Average minutes spent exercising at this level 60 min         3/4/2025   Social Factors   Frequency of gathering with friends or relatives Three times a week   Worry food won't last until get money to buy more No   Food not last or not have enough money for food? No   Do you have housing? (Housing is defined as stable permanent housing and does not include " staying ouside in a car, in a tent, in an abandoned building, in an overnight shelter, or couch-surfing.) Yes   Are you worried about losing your housing? No   Lack of transportation? No   Unable to get utilities (heat,electricity)? No         3/4/2025   Fall Risk   Fallen 2 or more times in the past year? No   Trouble with walking or balance? No          3/4/2025   Activities of Daily Living- Home Safety   Needs help with the following daily activites None of the above   Safety concerns in the home None of the above         3/4/2025   Dental   Dentist two times every year? (!) NO         3/4/2025   Hearing Screening   Hearing concerns? None of the above         3/4/2025   Driving Risk Screening   Patient/family members have concerns about driving No         3/4/2025   General Alertness/Fatigue Screening   Have you been more tired than usual lately? No         3/4/2025   Urinary Incontinence Screening   Bothered by leaking urine in past 6 months No            Today's PHQ-2 Score:       3/4/2025     7:02 AM   PHQ-2 (  [employer])   Q1: Little interest or pleasure in doing things 0   Q2: Feeling down, depressed or hopeless 0   PHQ-2 Score 0    Q1: Little interest or pleasure in doing things Not at all   Q2: Feeling down, depressed or hopeless Not at all   PHQ-2 Score 0       Patient-reported           3/4/2025   Substance Use   Alcohol more than 3/day or more than 7/wk No   Do you have a current opioid prescription? No   How severe/bad is pain from 1 to 10? 0/10 (No Pain)   Do you use any other substances recreationally? No     Social History     Tobacco Use    Smoking status: Some Days     Current packs/day: 0.00     Average packs/day: 1 pack/day for 35.0 years (35.0 ttl pk-yrs)     Types: Cigarettes, Cigars     Start date: 1983     Last attempt to quit: 2018     Years since quittin.2     Passive exposure: Current    Smokeless tobacco: Never    Tobacco comments:     ONE PACK PER WEEK   Vaping Use     Vaping status: Never Used   Substance Use Topics    Alcohol use: Yes     Comment: Alcoholic Drinks/day: 3/day    Drug use: No           3/4/2025   AAA Screening   Family history of Abdominal Aortic Aneurysm (AAA)? No   Last PSA:   Prostate Specific Antigen Screen   Date Value Ref Range Status   12/04/2023 0.65 0.00 - 4.50 ng/mL Final   06/03/2022 0.55 0.00 - 4.50 ug/L Final     ASCVD Risk   The 10-year ASCVD risk score (Maria Dolores MILLER, et al., 2019) is: 42.6%    Values used to calculate the score:      Age: 65 years      Sex: Male      Is Non- : No      Diabetic: Yes      Tobacco smoker: Yes      Systolic Blood Pressure: 144 mmHg      Is BP treated: Yes      HDL Cholesterol: 52 mg/dL      Total Cholesterol: 216 mg/dL            Reviewed and updated as needed this visit by Provider                    Past Medical History:   Diagnosis Date    Community acquired bacterial pneumonia 12/12/2018    ED (erectile dysfunction) 12/11/2020    Gross hematuria 01/01/2003    hematospermia/gross hematuria Normal cystoscopy, normal CT abd discontinued aspirin    Headache, post-traumatic 01/01/2010    CT negative after fall    History of gout 01/17/2017    Gout exacerbation 2012    HTN (hypertension)     Hyperlipidemia     Lipoma of torso 01/17/2017    5cm Jan 2017    Personal history of colonic polyps     Positive Cologuard.  Colonoscopy January 2021 with multiple adenomatous polyps, repeat in 3 years    Pleural effusion associated with pulmonary infection     Hospitalized with community-acquired pneumonia and parapneumonic effusion treated with chest tube and IV antibiotics    Rosacea 01/17/2017    Screening for AAA (abdominal aortic aneurysm)     [facility] screening 2022 showing no AAA    Tobacco abuse 01/17/2017    Low-dose CT chest negative for pulmonary nodules January 2017    Type 2 diabetes mellitus without complication (H)      Past Surgical History:   Procedure Laterality Date    ARTHROSCOPY KNEE  "     X 2    CATARACT EXTRACTION  2014    COLONOSCOPY      Normal colonoscopy 2003, has not had repeated since turning age 50 despite recommendation    IR PLEURAL DRAINAGE WITH CATHETER INSERTION  12/14/2018    POSTERIOR LAMINECTOMY / DECOMPRESSION CERVICAL SPINE  2002    STRIP VEIN      US THORACENTESIS  12/13/2018    US THORACENTESIS  2/4/2019     Current providers sharing in care for this patient include:  Patient Care Team:  Mckinley Gibson MD as PCP - General  Mckinley Gibson MD as Assigned PCP    The following health maintenance items are reviewed in Epic and correct as of today:  Health Maintenance   Topic Date Due    EYE EXAM  Never done    Pneumococcal Vaccine: 50+ Years (1 of 2 - PCV) Never done    LUNG CANCER SCREENING  07/18/2020    COLORECTAL CANCER SCREENING  01/25/2024    A1C  06/04/2024    INFLUENZA VACCINE (1) 09/01/2024    COVID-19 Vaccine (2 - 2024-25 season) 09/01/2024    BMP  12/04/2024    LIPID  12/04/2024    MICROALBUMIN  12/04/2024    DIABETIC FOOT EXAM  12/04/2024    ANNUAL REVIEW OF HM ORDERS  12/04/2024    MEDICARE ANNUAL WELLNESS VISIT  02/04/2025    DTAP/TDAP/TD IMMUNIZATION (3 - Td or Tdap) 09/21/2025    FALL RISK ASSESSMENT  03/04/2026    ADVANCE CARE PLANNING  06/03/2027    RSV VACCINE (1 - 1-dose 75+ series) 02/04/2035    HEPATITIS C SCREENING  Completed    PHQ-2 (once per calendar year)  Completed    ZOSTER IMMUNIZATION  Completed    HPV IMMUNIZATION  Aged Out    MENINGITIS IMMUNIZATION  Aged Out    HIV SCREENING  Discontinued         Review of Systems  Constitutional, HEENT, cardiovascular, pulmonary, GI, , musculoskeletal, neuro, skin, endocrine and psych systems are negative, except as otherwise noted.     Objective    Exam  BP (!) 144/72 (BP Location: Right arm, Patient Position: Sitting, Cuff Size: Adult Regular)   Pulse 65   Temp 98  F (36.7  C) (Oral)   Resp 16   Ht 1.702 m (5' 7\")   Wt 94.8 kg (209 lb)   SpO2 98%   BMI 32.73 kg/m     Estimated body mass index " "is 32.73 kg/m  as calculated from the following:    Height as of this encounter: 1.702 m (5' 7\").    Weight as of this encounter: 94.8 kg (209 lb).    Physical Exam  EYES: Eyelids, conjunctiva, and sclera were normal. Pupils were normal. Cornea, iris, and lens were normal bilaterally.  HEAD, EARS, NOSE, MOUTH, AND THROAT: Head and face were normal. TMs and external auditory canals are normal.  Oropharynx normal  NECK: Neck appearance was normal. There were no neck masses and the thyroid was not enlarged and no nodules are felt.  No lymphadenopathy.  RESPIRATORY: Breathing pattern was normal and the chest moved symmetrically.   Lung sounds were normal and there were no rales or wheezes.  CARDIOVASCULAR: Heart rate and rhythm were normal.  S1 and S2 were normal and there were no extra sounds or murmurs. Peripheral pulses in arms and legs were normal.  There was no peripheral edema.  No carotid bruits.  GASTROINTESTINAL:  Bowel sounds were present.   Palpation detected no tenderness, mass, or enlarged organs.   RECTAL/PROSTATE: Deferred  MUSCULOSKELETAL: Skeletal configuration was normal and muscle mass was normal for age. Joint appearance was overall normal.  LYMPHATIC: There were no enlarged nodes.  SKIN/HAIR/NAILS: Skin color was normal.  There were no concerning skin lesions.  NEUROLOGIC: The patient was alert and oriented to person, place, time, and circumstance. Speech was normal. Cranial nerves were normal. Motor strength was normal for age. The patient was normally coordinated.  Sensation intact.  Diabetic foot exam normal  PSYCHIATRIC:  Mood and affect were normal and the patient had normal recent and remote memory. The patient's judgment and insight were normal.         3/4/2025   Mini Cog   Clock Draw Score 2 Normal   3 Item Recall 2 objects recalled   Mini Cog Total Score 4             Signed Electronically by: Mckinley Gibson MD    "

## 2025-03-04 NOTE — PATIENT INSTRUCTIONS
Patient Education   Preventive Care Advice   This is general advice given by our system to help you stay healthy. However, your care team may have specific advice just for you. Please talk to your care team about your preventive care needs.  Nutrition  Eat 5 or more servings of fruits and vegetables each day.  Try wheat bread, brown rice and whole grain pasta (instead of white bread, rice, and pasta).  Get enough calcium and vitamin D. Check the label on foods and aim for 100% of the RDA (recommended daily allowance).  Lifestyle  Exercise at least 150 minutes each week  (30 minutes a day, 5 days a week).  Do muscle strengthening activities 2 days a week. These help control your weight and prevent disease.  No smoking.  Wear sunscreen to prevent skin cancer.  Have a dental exam and cleaning every 6 months.  Annual diabetic eye exam.  Make sure you get this scheduled and have your eye doctor fax the report to [phone]  Yearly exams  See your health care team every year to talk about:  Any changes in your health.  Any medicines your care team has prescribed.  Preventive care, family planning, and ways to prevent chronic diseases.  Shots (vaccines)   COVID-19 shot: Recommended  Flu shot: Get a flu shot every year.  Provided today  Tetanus shot: Get a tetanus shot every 10 years.  You will be due for a tetanus booster this year.  Please schedule a Tdap at your pharmacy  Pneumonia vaccine Prevnar 20 is recommended and can be scheduled at your pharmacy  Shingles shot (for age 50 and up).  Completed  General health tests  Diabetes screening:  Cholesterol test: Annually  Hepatitis C: Get tested at least once in your life.  Cancer screening tests  Colon cancer screening: It is important to start screening for colon cancer at age 45.  You are due for a colonoscopy.  Please call [facility] at [phone] to schedule  Prostate cancer screening test: Annual PSA  Lung cancer screening: annual low-dose CT scan  "for lung cancer screening is recommended and will be scheduled  For informational purposes only. Not to replace the advice of your health care provider. Copyright   2023 [facility]. All rights reserved. Clinically reviewed by the [facility] Transitions Program. [employer] 115582 - REV 01/24.  Eating Healthy Foods: Care Instructions  With every meal, you can make healthy food choices. Try to eat a variety of fruits, vegetables, whole grains, lean proteins, and low-fat dairy products. This can help you get the right balance of nutrients, including vitamins and minerals. Small changes add up over time. You can start by adding one healthy food to your meals each day.    Try to make half your plate fruits and vegetables, one-fourth whole grains, and one-fourth lean proteins. Try including dairy with your meals.   Eat more fruits and vegetables. Try to have them with most meals and snacks.   Foods for healthy eating        Fruits   These can be fresh, frozen, canned, or dried.  Try to choose whole fruit rather than fruit juice.  Eat a variety of colors.        Vegetables   These can be fresh, frozen, canned, or dried.  Beans, peas, and lentils count too.        Whole grains   Choose whole-grain breads, cereals, and noodles.  Try brown rice.        Lean proteins   These can include lean meat, poultry, fish, and eggs.  You can also have tofu, beans, peas, lentils, nuts, and seeds.        Dairy   Try milk, yogurt, and cheese.  Choose low-fat or fat-free when you can.  If you need to, use lactose-free milk or fortified plant-based milk products, such as soy milk.        Water   Drink water when you're thirsty.  Limit sugar-sweetened drinks, including soda, fruit drinks, and sports drinks.  Where can you learn more?  Go to https://www.healthwise.net/patiented  Enter T756 in the search box to learn more about \"Eating Healthy Foods: Care Instructions.\"  Current as of: September 20, 2023  Content Version: " "14.3    2024 [employer].   Care instructions adapted under license by your healthcare professional. If you have questions about a medical condition or this instruction, always ask your healthcare professional. [employer] disclaims any warranty or liability for your use of this information.    Learning About Being Physically Active  What is physical activity?     Being physically active means doing any kind of activity that gets your body moving.  The types of physical activity that can help you get fit and stay healthy include:  Aerobic or \"cardio\" activities. These make your heart beat faster and make you breathe harder, such as brisk walking, riding a bike, or running. They strengthen your heart and lungs and build up your endurance.  Strength training activities. These make your muscles work against, or \"resist,\" something. Examples include lifting weights or doing push-ups. These activities help tone and strengthen your muscles and bones.  Stretches. These let you move your joints and muscles through their full range of motion. Stretching helps you be more flexible.  Reaching a balance between these three types of physical activity is important because each one contributes to your overall fitness.  What are the benefits of being active?  Being active is one of the best things you can do for your health. It helps you to:  Feel stronger and have more energy to do all the things you like to do.  Focus better at school or work.  Feel, think, and sleep better.  Reach and stay at a healthy weight.  Lose fat and build lean muscle.  Lower your risk for serious health problems, including diabetes, heart attack, high blood pressure, and some cancers.  Keep your heart, lungs, bones, muscles, and joints strong and healthy.  How can you make being active part of your life?  Start slowly. Make it your long-term goal to get at least 30 minutes of exercise on most days of the week. Walking is a good " "choice. You also may want to do other activities, such as running, swimming, cycling, or playing tennis or team sports.  Pick activities that you like--ones that make your heart beat faster, your muscles stronger, and your muscles and joints more flexible. If you find more than one thing you like doing, do them all. You don't have to do the same thing every day.  Get your heart pumping every day. Any activity that makes your heart beat faster and keeps it at that rate for a while counts.  Here are some great ways to get your heart beating faster:  Go for a brisk walk, run, or hike.  Go for a swim or bike ride.  Take an online exercise class or dance.  Play a game of touch football, basketball, or soccer.  Play tennis, pickleball, or racquetball.  Climb stairs.  Even some household chores can be aerobic. Just do them at a faster pace. Raking or mowing the lawn, sweeping the garage, and vacuuming and cleaning your home all can help get your heart rate up.  Strengthen your muscles during the week. You don't have to lift heavy weights or grow big, bulky muscles to get stronger. Doing a few simple activities that make your muscles work against, or \"resist,\" something can help you get stronger. Aim for at least twice a week.  For example, you can:  Do push-ups or sit-ups, which use your own body weight as resistance.  Lift weights or dumbbells or use stretch bands at home or in a gym or community center.  Stretch your muscles often. Stretching will help you as you become more active. It can help you stay flexible and loosen tight muscles. It can also help improve your balance and posture and can be a great way to relax.  Be sure to stretch the muscles you'll be using when you work out. It's best to warm your muscles slightly before you stretch them. Walk or do some other light aerobic activity for a few minutes. Then start stretching.  When you stretch your muscles:  Do it slowly. Stretching is not about going fast or " "making sudden movements.  Don't push or bounce during a stretch.  Hold each stretch for at least 15 to 30 seconds, if you can. You should feel a stretch in the muscle, but not pain.  Breathe out as you do the stretch. Then breathe in as you hold the stretch. Don't hold your breath.  If you're worried about how more activity might affect your health, have a checkup before you start. Follow any special advice your doctor gives you for getting a smart start.  Where can you learn more?  Go to https://www.[employer].net/patiented  Enter W332 in the search box to learn more about \"Learning About Being Physically Active.\"  Current as of: July 31, 2024  Content Version: 14.3    2024 [employer].   Care instructions adapted under license by your healthcare professional. If you have questions about a medical condition or this instruction, always ask your healthcare professional. [employer] disclaims any warranty or liability for your use of this information.       "

## 2025-04-15 ENCOUNTER — ALLIED HEALTH/NURSE VISIT (OUTPATIENT)
Dept: FAMILY MEDICINE | Facility: CLINIC | Age: 65
End: 2025-04-15
Payer: COMMERCIAL

## 2025-04-15 VITALS — DIASTOLIC BLOOD PRESSURE: 76 MMHG | OXYGEN SATURATION: 98 % | HEART RATE: 65 BPM | SYSTOLIC BLOOD PRESSURE: 143 MMHG

## 2025-04-15 DIAGNOSIS — Z01.30 BLOOD PRESSURE CHECK: Primary | ICD-10-CM

## 2025-04-15 PROCEDURE — 99207 PR NO CHARGE NURSE ONLY: CPT

## 2025-04-15 PROCEDURE — 3078F DIAST BP <80 MM HG: CPT

## 2025-04-15 PROCEDURE — 3075F SYST BP GE 130 - 139MM HG: CPT

## 2025-04-15 NOTE — PROGRESS NOTES
Emile Osborne is a 65 year old year old patient who comes in today for a Blood Pressure check because of ongoing blood pressure monitoring.  Vital Signs as repeated by RN MADELINE  Patient is taking medication as prescribed  Patient is tolerating medications well.  Patient is not monitoring Blood Pressure at home.  Average readings if yes are NA  Current complaints: none  Disposition:  Recommended home BP checking and patient he is planning to get a home BP machine. Routing to PCP for review.    BP Readings from Last 3 Encounters:   04/15/25 (!) 143/76   03/04/25 (!) 144/70   12/04/23 (!) 150/74

## 2025-08-25 DIAGNOSIS — N52.9 ERECTILE DYSFUNCTION, UNSPECIFIED ERECTILE DYSFUNCTION TYPE: ICD-10-CM

## 2025-08-25 RX ORDER — SILDENAFIL 100 MG/1
TABLET, FILM COATED ORAL
Qty: 12 TABLET | Refills: 5 | Status: SHIPPED | OUTPATIENT
Start: 2025-08-25